# Patient Record
Sex: FEMALE | Race: OTHER | URBAN - METROPOLITAN AREA
[De-identification: names, ages, dates, MRNs, and addresses within clinical notes are randomized per-mention and may not be internally consistent; named-entity substitution may affect disease eponyms.]

---

## 2019-04-23 ENCOUNTER — INPATIENT (INPATIENT)
Facility: HOSPITAL | Age: 84
LOS: 3 days | Discharge: HOME HEALTH | End: 2019-04-27
Attending: HOSPITALIST | Admitting: HOSPITALIST
Payer: MEDICARE

## 2019-04-23 VITALS
OXYGEN SATURATION: 98 % | DIASTOLIC BLOOD PRESSURE: 82 MMHG | SYSTOLIC BLOOD PRESSURE: 132 MMHG | HEART RATE: 136 BPM | RESPIRATION RATE: 20 BRPM | TEMPERATURE: 97 F

## 2019-04-23 LAB
ALBUMIN SERPL ELPH-MCNC: 4 G/DL — SIGNIFICANT CHANGE UP (ref 3.5–5.2)
ALP SERPL-CCNC: 289 U/L — HIGH (ref 30–115)
ALT FLD-CCNC: 86 U/L — HIGH (ref 0–41)
ANION GAP SERPL CALC-SCNC: 17 MMOL/L — HIGH (ref 7–14)
APTT BLD: 25.5 SEC — LOW (ref 27–39.2)
AST SERPL-CCNC: 73 U/L — HIGH (ref 0–41)
BASE EXCESS BLDV CALC-SCNC: -1.1 MMOL/L — SIGNIFICANT CHANGE UP (ref -2–2)
BILIRUB SERPL-MCNC: 0.8 MG/DL — SIGNIFICANT CHANGE UP (ref 0.2–1.2)
BUN SERPL-MCNC: 29 MG/DL — HIGH (ref 10–20)
CA-I SERPL-SCNC: 0.92 MMOL/L — LOW (ref 1.12–1.3)
CALCIUM SERPL-MCNC: 7.4 MG/DL — LOW (ref 8.5–10.1)
CHLORIDE SERPL-SCNC: 93 MMOL/L — LOW (ref 98–110)
CO2 SERPL-SCNC: 20 MMOL/L — SIGNIFICANT CHANGE UP (ref 17–32)
CREAT SERPL-MCNC: 1.1 MG/DL — SIGNIFICANT CHANGE UP (ref 0.7–1.5)
D DIMER BLD IA.RAPID-MCNC: 1051 NG/ML DDU — HIGH (ref 0–230)
GAS PNL BLDV: 126 MMOL/L — LOW (ref 136–145)
GAS PNL BLDV: SIGNIFICANT CHANGE UP
GLUCOSE SERPL-MCNC: 158 MG/DL — HIGH (ref 70–99)
HCO3 BLDV-SCNC: 24 MMOL/L — SIGNIFICANT CHANGE UP (ref 22–29)
HCT VFR BLD CALC: 39.2 % — SIGNIFICANT CHANGE UP (ref 37–47)
HCT VFR BLDA CALC: 57.6 % — HIGH (ref 34–44)
HGB BLD CALC-MCNC: 18.8 G/DL — HIGH (ref 14–18)
HGB BLD-MCNC: 12.9 G/DL — SIGNIFICANT CHANGE UP (ref 12–16)
INR BLD: 1.33 RATIO — HIGH (ref 0.65–1.3)
INR BLD: 1.36 RATIO — HIGH (ref 0.65–1.3)
LACTATE BLDV-MCNC: 1.8 MMOL/L — HIGH (ref 0.5–1.6)
MCHC RBC-ENTMCNC: 28.4 PG — SIGNIFICANT CHANGE UP (ref 27–31)
MCHC RBC-ENTMCNC: 32.9 G/DL — SIGNIFICANT CHANGE UP (ref 32–37)
MCV RBC AUTO: 86.3 FL — SIGNIFICANT CHANGE UP (ref 81–99)
NRBC # BLD: 0 /100 WBCS — SIGNIFICANT CHANGE UP (ref 0–0)
NT-PROBNP SERPL-SCNC: 4307 PG/ML — HIGH (ref 0–300)
PCO2 BLDV: 40 MMHG — LOW (ref 41–51)
PH BLDV: 7.38 — SIGNIFICANT CHANGE UP (ref 7.26–7.43)
PLATELET # BLD AUTO: 273 K/UL — SIGNIFICANT CHANGE UP (ref 130–400)
PO2 BLDV: 21 MMHG — SIGNIFICANT CHANGE UP (ref 20–40)
POTASSIUM BLDV-SCNC: 4.3 MMOL/L — SIGNIFICANT CHANGE UP (ref 3.3–5.6)
POTASSIUM SERPL-MCNC: 4.9 MMOL/L — SIGNIFICANT CHANGE UP (ref 3.5–5)
POTASSIUM SERPL-SCNC: 4.9 MMOL/L — SIGNIFICANT CHANGE UP (ref 3.5–5)
PROT SERPL-MCNC: 6.3 G/DL — SIGNIFICANT CHANGE UP (ref 6–8)
PROTHROM AB SERPL-ACNC: 15.2 SEC — HIGH (ref 9.95–12.87)
PROTHROM AB SERPL-ACNC: 15.6 SEC — HIGH (ref 9.95–12.87)
RBC # BLD: 4.54 M/UL — SIGNIFICANT CHANGE UP (ref 4.2–5.4)
RBC # FLD: 14.8 % — HIGH (ref 11.5–14.5)
SAO2 % BLDV: 21 % — SIGNIFICANT CHANGE UP
SODIUM SERPL-SCNC: 130 MMOL/L — LOW (ref 135–146)
TROPONIN T SERPL-MCNC: <0.01 NG/ML — SIGNIFICANT CHANGE UP
WBC # BLD: 8.78 K/UL — SIGNIFICANT CHANGE UP (ref 4.8–10.8)
WBC # FLD AUTO: 8.78 K/UL — SIGNIFICANT CHANGE UP (ref 4.8–10.8)

## 2019-04-23 PROCEDURE — 93010 ELECTROCARDIOGRAM REPORT: CPT | Mod: 77

## 2019-04-23 PROCEDURE — 71045 X-RAY EXAM CHEST 1 VIEW: CPT | Mod: 26

## 2019-04-23 PROCEDURE — 99285 EMERGENCY DEPT VISIT HI MDM: CPT

## 2019-04-23 PROCEDURE — 93010 ELECTROCARDIOGRAM REPORT: CPT

## 2019-04-23 PROCEDURE — 74177 CT ABD & PELVIS W/CONTRAST: CPT | Mod: 26

## 2019-04-23 PROCEDURE — 71275 CT ANGIOGRAPHY CHEST: CPT | Mod: 26

## 2019-04-23 RX ORDER — SODIUM CHLORIDE 9 MG/ML
500 INJECTION INTRAMUSCULAR; INTRAVENOUS; SUBCUTANEOUS ONCE
Qty: 0 | Refills: 0 | Status: COMPLETED | OUTPATIENT
Start: 2019-04-23 | End: 2019-04-23

## 2019-04-23 RX ORDER — DILTIAZEM HCL 120 MG
10 CAPSULE, EXT RELEASE 24 HR ORAL ONCE
Qty: 0 | Refills: 0 | Status: COMPLETED | OUTPATIENT
Start: 2019-04-23 | End: 2019-04-23

## 2019-04-23 RX ORDER — SODIUM CHLORIDE 9 MG/ML
500 INJECTION, SOLUTION INTRAVENOUS ONCE
Qty: 0 | Refills: 0 | Status: COMPLETED | OUTPATIENT
Start: 2019-04-23 | End: 2019-04-23

## 2019-04-23 RX ORDER — FUROSEMIDE 40 MG
40 TABLET ORAL ONCE
Qty: 0 | Refills: 0 | Status: COMPLETED | OUTPATIENT
Start: 2019-04-23 | End: 2019-04-23

## 2019-04-23 RX ORDER — ADENOSINE 3 MG/ML
6 INJECTION INTRAVENOUS ONCE
Qty: 0 | Refills: 0 | Status: COMPLETED | OUTPATIENT
Start: 2019-04-23 | End: 2019-04-23

## 2019-04-23 RX ORDER — ADENOSINE 3 MG/ML
12 INJECTION INTRAVENOUS ONCE
Qty: 0 | Refills: 0 | Status: COMPLETED | OUTPATIENT
Start: 2019-04-23 | End: 2019-04-23

## 2019-04-23 RX ORDER — ADENOSINE 3 MG/ML
18 INJECTION INTRAVENOUS ONCE
Qty: 0 | Refills: 0 | Status: COMPLETED | OUTPATIENT
Start: 2019-04-23 | End: 2019-04-23

## 2019-04-23 RX ADMIN — SODIUM CHLORIDE 500 MILLILITER(S): 9 INJECTION, SOLUTION INTRAVENOUS at 18:48

## 2019-04-23 RX ADMIN — Medication 40 MILLIGRAM(S): at 23:58

## 2019-04-23 RX ADMIN — ADENOSINE 6 MILLIGRAM(S): 3 INJECTION INTRAVENOUS at 18:57

## 2019-04-23 RX ADMIN — Medication 10 MILLIGRAM(S): at 22:07

## 2019-04-23 RX ADMIN — ADENOSINE 12 MILLIGRAM(S): 3 INJECTION INTRAVENOUS at 18:57

## 2019-04-23 RX ADMIN — ADENOSINE 18 MILLIGRAM(S): 3 INJECTION INTRAVENOUS at 18:57

## 2019-04-23 RX ADMIN — SODIUM CHLORIDE 500 MILLILITER(S): 9 INJECTION INTRAMUSCULAR; INTRAVENOUS; SUBCUTANEOUS at 22:07

## 2019-04-23 NOTE — ED ADULT NURSE NOTE - OBJECTIVE STATEMENT
Pt c/o SOB and palpitations. Denies CP. Apical pulse tachycardic, breath sounds clear and equal bilaterally. BP stable, HR tachycardic.

## 2019-04-23 NOTE — ED ADULT TRIAGE NOTE - CHIEF COMPLAINT QUOTE
c/o palpitations and SOB x 4 days. Denies CP. c/o palpitations and SOB x 4 days. Denies CP. r/o CHF +swelling of lower extremities.

## 2019-04-24 LAB
ALBUMIN SERPL ELPH-MCNC: 3.6 G/DL — SIGNIFICANT CHANGE UP (ref 3.5–5.2)
ALP SERPL-CCNC: 243 U/L — HIGH (ref 30–115)
ALT FLD-CCNC: 73 U/L — HIGH (ref 0–41)
ANION GAP SERPL CALC-SCNC: 15 MMOL/L — HIGH (ref 7–14)
APTT BLD: 51.8 SEC — HIGH (ref 27–39.2)
APTT BLD: 85.1 SEC — CRITICAL HIGH (ref 27–39.2)
AST SERPL-CCNC: 65 U/L — HIGH (ref 0–41)
BASOPHILS # BLD AUTO: 0.03 K/UL — SIGNIFICANT CHANGE UP (ref 0–0.2)
BASOPHILS NFR BLD AUTO: 0.4 % — SIGNIFICANT CHANGE UP (ref 0–1)
BILIRUB SERPL-MCNC: 0.8 MG/DL — SIGNIFICANT CHANGE UP (ref 0.2–1.2)
BUN SERPL-MCNC: 25 MG/DL — HIGH (ref 10–20)
CALCIUM SERPL-MCNC: 6.6 MG/DL — LOW (ref 8.5–10.1)
CHLORIDE SERPL-SCNC: 94 MMOL/L — LOW (ref 98–110)
CK MB CFR SERPL CALC: 5.6 NG/ML — SIGNIFICANT CHANGE UP (ref 0.6–6.3)
CO2 SERPL-SCNC: 21 MMOL/L — SIGNIFICANT CHANGE UP (ref 17–32)
CREAT SERPL-MCNC: 1 MG/DL — SIGNIFICANT CHANGE UP (ref 0.7–1.5)
EOSINOPHIL # BLD AUTO: 0.09 K/UL — SIGNIFICANT CHANGE UP (ref 0–0.7)
EOSINOPHIL NFR BLD AUTO: 1.1 % — SIGNIFICANT CHANGE UP (ref 0–8)
GLUCOSE BLDC GLUCOMTR-MCNC: 133 MG/DL — HIGH (ref 70–99)
GLUCOSE SERPL-MCNC: 113 MG/DL — HIGH (ref 70–99)
HCT VFR BLD CALC: 34.6 % — LOW (ref 37–47)
HGB BLD-MCNC: 11.3 G/DL — LOW (ref 12–16)
IMM GRANULOCYTES NFR BLD AUTO: 0.5 % — HIGH (ref 0.1–0.3)
INR BLD: 1.42 RATIO — HIGH (ref 0.65–1.3)
LACTATE SERPL-SCNC: 2.1 MMOL/L — SIGNIFICANT CHANGE UP (ref 0.5–2.2)
LYMPHOCYTES # BLD AUTO: 1.9 K/UL — SIGNIFICANT CHANGE UP (ref 1.2–3.4)
LYMPHOCYTES # BLD AUTO: 23.6 % — SIGNIFICANT CHANGE UP (ref 20.5–51.1)
MAGNESIUM SERPL-MCNC: 1.5 MG/DL — LOW (ref 1.8–2.4)
MCHC RBC-ENTMCNC: 28.3 PG — SIGNIFICANT CHANGE UP (ref 27–31)
MCHC RBC-ENTMCNC: 32.7 G/DL — SIGNIFICANT CHANGE UP (ref 32–37)
MCV RBC AUTO: 86.5 FL — SIGNIFICANT CHANGE UP (ref 81–99)
MONOCYTES # BLD AUTO: 0.64 K/UL — HIGH (ref 0.1–0.6)
MONOCYTES NFR BLD AUTO: 8 % — SIGNIFICANT CHANGE UP (ref 1.7–9.3)
NEUTROPHILS # BLD AUTO: 5.34 K/UL — SIGNIFICANT CHANGE UP (ref 1.4–6.5)
NEUTROPHILS NFR BLD AUTO: 66.4 % — SIGNIFICANT CHANGE UP (ref 42.2–75.2)
NRBC # BLD: 0 /100 WBCS — SIGNIFICANT CHANGE UP (ref 0–0)
PLATELET # BLD AUTO: 255 K/UL — SIGNIFICANT CHANGE UP (ref 130–400)
POTASSIUM SERPL-MCNC: 4.4 MMOL/L — SIGNIFICANT CHANGE UP (ref 3.5–5)
POTASSIUM SERPL-SCNC: 4.4 MMOL/L — SIGNIFICANT CHANGE UP (ref 3.5–5)
PROT SERPL-MCNC: 5.5 G/DL — LOW (ref 6–8)
PROTHROM AB SERPL-ACNC: 16.3 SEC — HIGH (ref 9.95–12.87)
RBC # BLD: 4 M/UL — LOW (ref 4.2–5.4)
RBC # FLD: 15 % — HIGH (ref 11.5–14.5)
SODIUM SERPL-SCNC: 130 MMOL/L — LOW (ref 135–146)
TROPONIN T SERPL-MCNC: 0.01 NG/ML — SIGNIFICANT CHANGE UP
TROPONIN T SERPL-MCNC: 0.01 NG/ML — SIGNIFICANT CHANGE UP
WBC # BLD: 8.04 K/UL — SIGNIFICANT CHANGE UP (ref 4.8–10.8)
WBC # FLD AUTO: 8.04 K/UL — SIGNIFICANT CHANGE UP (ref 4.8–10.8)

## 2019-04-24 PROCEDURE — 99222 1ST HOSP IP/OBS MODERATE 55: CPT

## 2019-04-24 PROCEDURE — 70450 CT HEAD/BRAIN W/O DYE: CPT | Mod: 26

## 2019-04-24 PROCEDURE — 93010 ELECTROCARDIOGRAM REPORT: CPT

## 2019-04-24 PROCEDURE — 76705 ECHO EXAM OF ABDOMEN: CPT | Mod: 26

## 2019-04-24 PROCEDURE — 71045 X-RAY EXAM CHEST 1 VIEW: CPT | Mod: 26

## 2019-04-24 RX ORDER — HEPARIN SODIUM 5000 [USP'U]/ML
800 INJECTION INTRAVENOUS; SUBCUTANEOUS
Qty: 25000 | Refills: 0 | Status: DISCONTINUED | OUTPATIENT
Start: 2019-04-24 | End: 2019-04-25

## 2019-04-24 RX ORDER — SODIUM CHLORIDE 9 MG/ML
500 INJECTION INTRAMUSCULAR; INTRAVENOUS; SUBCUTANEOUS ONCE
Qty: 0 | Refills: 0 | Status: COMPLETED | OUTPATIENT
Start: 2019-04-24 | End: 2019-04-24

## 2019-04-24 RX ORDER — CHLORHEXIDINE GLUCONATE 213 G/1000ML
1 SOLUTION TOPICAL
Qty: 0 | Refills: 0 | Status: DISCONTINUED | OUTPATIENT
Start: 2019-04-24 | End: 2019-04-27

## 2019-04-24 RX ORDER — DILTIAZEM HCL 120 MG
5 CAPSULE, EXT RELEASE 24 HR ORAL
Qty: 125 | Refills: 0 | Status: DISCONTINUED | OUTPATIENT
Start: 2019-04-24 | End: 2019-04-24

## 2019-04-24 RX ORDER — DOCUSATE SODIUM 100 MG
100 CAPSULE ORAL
Qty: 0 | Refills: 0 | Status: DISCONTINUED | OUTPATIENT
Start: 2019-04-24 | End: 2019-04-27

## 2019-04-24 RX ORDER — FUROSEMIDE 40 MG
40 TABLET ORAL DAILY
Qty: 0 | Refills: 0 | Status: DISCONTINUED | OUTPATIENT
Start: 2019-04-24 | End: 2019-04-25

## 2019-04-24 RX ORDER — DILTIAZEM HCL 120 MG
30 CAPSULE, EXT RELEASE 24 HR ORAL EVERY 8 HOURS
Qty: 0 | Refills: 0 | Status: DISCONTINUED | OUTPATIENT
Start: 2019-04-24 | End: 2019-04-25

## 2019-04-24 RX ORDER — ENOXAPARIN SODIUM 100 MG/ML
40 INJECTION SUBCUTANEOUS AT BEDTIME
Qty: 0 | Refills: 0 | Status: DISCONTINUED | OUTPATIENT
Start: 2019-04-24 | End: 2019-04-24

## 2019-04-24 RX ORDER — APIXABAN 2.5 MG/1
10 TABLET, FILM COATED ORAL EVERY 12 HOURS
Qty: 0 | Refills: 0 | Status: DISCONTINUED | OUTPATIENT
Start: 2019-04-24 | End: 2019-04-24

## 2019-04-24 RX ORDER — OMEPRAZOLE 10 MG/1
1 CAPSULE, DELAYED RELEASE ORAL
Qty: 0 | Refills: 0 | COMMUNITY

## 2019-04-24 RX ORDER — DILTIAZEM HCL 120 MG
10 CAPSULE, EXT RELEASE 24 HR ORAL ONCE
Qty: 0 | Refills: 0 | Status: COMPLETED | OUTPATIENT
Start: 2019-04-24 | End: 2019-04-24

## 2019-04-24 RX ORDER — MAGNESIUM SULFATE 500 MG/ML
2 VIAL (ML) INJECTION
Qty: 0 | Refills: 0 | Status: COMPLETED | OUTPATIENT
Start: 2019-04-24 | End: 2019-04-24

## 2019-04-24 RX ORDER — METOPROLOL TARTRATE 50 MG
25 TABLET ORAL DAILY
Qty: 0 | Refills: 0 | Status: DISCONTINUED | OUTPATIENT
Start: 2019-04-24 | End: 2019-04-24

## 2019-04-24 RX ORDER — DILTIAZEM HCL 120 MG
30 CAPSULE, EXT RELEASE 24 HR ORAL EVERY 6 HOURS
Qty: 0 | Refills: 0 | Status: DISCONTINUED | OUTPATIENT
Start: 2019-04-24 | End: 2019-04-24

## 2019-04-24 RX ORDER — PANTOPRAZOLE SODIUM 20 MG/1
40 TABLET, DELAYED RELEASE ORAL
Qty: 0 | Refills: 0 | Status: DISCONTINUED | OUTPATIENT
Start: 2019-04-24 | End: 2019-04-27

## 2019-04-24 RX ADMIN — Medication 100 MILLIGRAM(S): at 18:22

## 2019-04-24 RX ADMIN — Medication 10 MILLIGRAM(S): at 00:46

## 2019-04-24 RX ADMIN — Medication 25 GRAM(S): at 10:45

## 2019-04-24 RX ADMIN — Medication 5 MG/HR: at 00:29

## 2019-04-24 RX ADMIN — Medication 25 MILLIGRAM(S): at 06:16

## 2019-04-24 RX ADMIN — Medication 30 MILLIGRAM(S): at 06:21

## 2019-04-24 RX ADMIN — Medication 5 MG/HR: at 06:19

## 2019-04-24 RX ADMIN — Medication 25 GRAM(S): at 14:46

## 2019-04-24 RX ADMIN — Medication 30 MILLIGRAM(S): at 14:48

## 2019-04-24 RX ADMIN — PANTOPRAZOLE SODIUM 40 MILLIGRAM(S): 20 TABLET, DELAYED RELEASE ORAL at 06:16

## 2019-04-24 RX ADMIN — SODIUM CHLORIDE 1000 MILLILITER(S): 9 INJECTION INTRAMUSCULAR; INTRAVENOUS; SUBCUTANEOUS at 09:25

## 2019-04-24 RX ADMIN — Medication 40 MILLIGRAM(S): at 06:19

## 2019-04-24 RX ADMIN — Medication 100 MILLIGRAM(S): at 06:16

## 2019-04-24 RX ADMIN — HEPARIN SODIUM 8 UNIT(S)/HR: 5000 INJECTION INTRAVENOUS; SUBCUTANEOUS at 15:21

## 2019-04-24 NOTE — ED PROVIDER NOTE - OBJECTIVE STATEMENT
89 year old female with a pmh of CHF came in because she was sent in by her PMD in the [place] for further evaluation. Patient states she recently came back from [facility] 89 year old female with a pmh of CHF came in because she was sent in by her PMD in the [place] for further evaluation. Patient states she recently came back from [place] one week ago and has been having sob. Patient went to see her PMD today and sent to ED for further eval. Patient denies fever chills cough n/v abdominal pain urinary frequency urgency burning.

## 2019-04-24 NOTE — H&P ADULT - ATTENDING COMMENTS
HPI as above.  Interval history: Pt seen and examined at bedside. Pt Azeri speaking. Daughter at bedside speaks english. Daughter states that 3 weeks ago the pt was fine and started to develop palpitations on and off. She then began having sob on ambulation and was unable to walk 1 block without sob. She also was exhibiting Orthopnea and increased usage of pillows from 1-2 due to sob.  She then went to see her PMD in the [place] yesterday who told her she needs to go to the ER to be checked out. No previous hx or occurrence  in the past as per daughter. Further review of her med list shows she also is on lisinopril 10 mg and atorvastatin 10 mg.  In the ED pt was placed on cardizem ggt for loading and eventually switched to PO but ggt was not stopped. In the am Pt bp dropped to 60s and developed AMS. Stroke code called see separate note.     Vital Signs (24 Hrs):  T(C): 37 (04-24-19 @ 07:39), Max: 37 (04-24-19 @ 07:39)  HR: 64 (04-24-19 @ 10:46) (64 - 136)  BP: 91/58 (04-24-19 @ 10:46) (60/45 - 137/93)  RR: 20 (04-24-19 @ 10:46) (18 - 20)  SpO2: 99% (04-24-19 @ 10:46) (96% - 100%)  Wt(kg): --  Daily     Daily     I&O's Summary    23 Apr 2019 07:01  -  24 Apr 2019 07:00  --------------------------------------------------------  IN: 30 mL / OUT: 0 mL / NET: 30 mL      Exam:  PHYSICAL EXAM:  GENERAL: NAD, well-developed  HEAD:  Atraumatic, Normocephalic  EYES: EOMI, PERRLA, conjunctiva and sclera clear  NECK: Supple, No JVD  CHEST/LUNG: mild bibasilar rales  HEART: Regular rate and irrregular rhythm; No murmurs, rubs, or gallops  ABDOMEN: Soft, Nontender, Nondistended; Bowel sounds present  EXTREMITIES:  2+ Peripheral Pulses, No clubbing, cyanosis, or edema  PSYCH: AAOx2  NEUROLOGY: weakness on left lower ext, mild droop on right face and slurring of speech (new according to daughter)  SKIN: No rashes or lesions    Labs reviewed  Imaging reviewed < from: Xray Chest 1 View AP/PA (04.24.19 @ 06:35) >    Cardiomegaly. Right midlung layering fluid.    < end of copied text >  1. Reflux of intravenous contrast into the IVC, cardiomegaly, trace   bilateral pleural effusions with interstitial edema; findings can be seen   with CHF.    2. No evidence of acute pulmonary embolismor acute intra-abdominal   pathology.    < end of copied text >    Diagnosis Line Atrial fibrillation  Septal infarct , age undetermined  Abnormal ECG    < end of copied text >    Plan  #New on set Afib-CHadsvasc 4- currently RC on BB and cardizem 30 mg TID- decreased from q6hr- will need AC- pending MRI- dw neuro recommended hold of on NOAC for now until stroke anderson complete and can start heparin ggt in the mean time. Risk of bleed on heparin ggt dw family and agreed. Will dw family risks of bleeding with NOAC once cleared. Cardio consult placed     #Hypotensive shock 2/2 afib with RVR vs CHF- CT shows vascular congestion- not on pressor for now- ok to keep BP MAP 60-65 for now- fu cardio, given IVF 1 L bolus, monitor BP and respiratory status.     #Encephalopathy likely 2/2 hypotension vs CVA- fu MRI, sx improved after BP improved    #Suspected HFrEF- pt on BB and lisinopril at home- fu 2d echo for confirmation, po lasix for now as pt o2 sat 96% on RA and may need IV lasix once BP improves.      #Gall bladder wall edema- elevated ALP- check ruq     #Progress Note Handoff  Pending (specify):  Consults____Cardio, neuro_____, Tests___2d echo_____, Test Results_______, Other_________  Family discussion: homer pt and family and agreed with plan  Disposition: Home___/SNF___/Other________/Unknown at this time___x_____

## 2019-04-24 NOTE — CONSULT NOTE ADULT - SUBJECTIVE AND OBJECTIVE BOX
HPI:  89 y.o. f w/ PMHx of HTN, DLD presents with a chief complaint of palpitations associated with shortness of breath. It started 4 days ago and progressively worsened since then. Patient denies ever having this before. Patient denies any fevers, chills, or night sweats. Patient denies any nausea, vomiting, or diarrhea.     In ER- EKG looked like SVT so was given adenosine 6, 12, then 18 with no improvement, then was determiend it was Afib w/ RVR and cardizem was given which brought the HR down to around 110's. (24 Apr 2019 02:25)      PAST MEDICAL & SURGICAL HISTORY:  Dyslipidemia  Hypertension  No significant past surgical history      Hospital Course:    TODAY'S SUBJECTIVE & REVIEW OF SYMPTOMS:     Constitutional WNL   Cardio WNL   Resp WNL   GI WNL  Heme WNL  Endo WNL  Skin WNL  MSK Weakness  Neuro WNL  Cognitive WNL  Psych WNL      MEDICATIONS  (STANDING):  chlorhexidine 2% Cloths 1 Application(s) Topical <User Schedule>  diltiazem    Tablet 30 milliGRAM(s) Oral every 8 hours  docusate sodium 100 milliGRAM(s) Oral two times a day  furosemide    Tablet 40 milliGRAM(s) Oral daily  heparin  Infusion 800 Unit(s)/Hr (8 mL/Hr) IV Continuous <Continuous>  metoprolol succinate ER 25 milliGRAM(s) Oral daily  pantoprazole    Tablet 40 milliGRAM(s) Oral before breakfast    MEDICATIONS  (PRN):      FAMILY HISTORY:  No pertinent family history in first degree relatives      Allergies    No Known Allergies    Intolerances        SOCIAL HISTORY:    [  ] Etoh  [  ] Smoking  [  ] Substance abuse     Home Environment:  [  ] Home Alone  [x  ] Lives with Family  [  ] Home Health Aid    Dwelling:  [  ] Apartment  [ x ] Private House  [  ] Adult Home  [  ] Skilled Nursing Facility      [  ] Short Term  [  ] Long Term  [xx  ] Stairs       Elevator [  ]    FUNCTIONAL STATUS PTA: (Check all that apply)  Ambulation: [ x  ]Independent    [  ] Dependent     [  ] Non-Ambulatory  Assistive Device: [  ] SA Cane  [  ]  Q Cane  [  ] Walker  [  ]  Wheelchair  ADL : [x  ] Independent  [  ]  Dependent       Vital Signs Last 24 Hrs  T(C): 36.2 (24 Apr 2019 15:43), Max: 37 (24 Apr 2019 07:39)  T(F): 97.1 (24 Apr 2019 15:43), Max: 98.6 (24 Apr 2019 07:39)  HR: 62 (24 Apr 2019 15:43) (62 - 136)  BP: 88/58 (24 Apr 2019 15:43) (60/45 - 137/93)  BP(mean): 70 (24 Apr 2019 04:47) (70 - 110)  RR: 17 (24 Apr 2019 15:43) (17 - 20)  SpO2: 97% (24 Apr 2019 15:43) (96% - 100%)      PHYSICAL EXAM: Alert & Oriented X3  GENERAL: NAD, well-groomed, well-developed  HEAD:  Atraumatic, Normocephalic  CHEST/LUNG: Clear   HEART: S1S2+  ABDOMEN: Soft, Nontender  EXTREMITIES:  no calf tenderness    NERVOUS SYSTEM:  Cranial Nerves 2-12 intact [  ] Abnormal  [  ]  ROM: WFL all extremities [ x ]  Abnormal [  ]  Motor Strength: WFL all extremities  [  ]  Abnormal [x  ]4/5 all ext  Sensation: intact to light touch [x  ] Abnormal [  ]  Reflexes: Symmetric [  ]  Abnormal [  ]    FUNCTIONAL STATUS:  Bed Mobility: Independent [  ]  Supervision [  ]  Needs Assistance [x  ]  N/A [  ]  Transfers: Independent [  ]  Supervision [  ]  Needs Assistance [x  ]  N/A [  ]   Ambulation: Independent [  ]  Supervision [  ]  Needs Assistance [  ]  N/A [  ]  ADL: Independent [  ] Requires Assistance [  ] N/A [  ]      LABS:                        11.3   8.04  )-----------( 255      ( 24 Apr 2019 07:42 )             34.6     04-24    130<L>  |  94<L>  |  25<H>  ----------------------------<  113<H>  4.4   |  21  |  1.0    Ca    6.6<L>      24 Apr 2019 07:42  Mg     1.5     04-24    TPro  5.5<L>  /  Alb  3.6  /  TBili  0.8  /  DBili  x   /  AST  65<H>  /  ALT  73<H>  /  AlkPhos  243<H>  04-24    PT/INR - ( 23 Apr 2019 20:45 )   PT: 15.60 sec;   INR: 1.36 ratio         PTT - ( 23 Apr 2019 20:45 )  PTT:25.5 sec      RADIOLOGY & ADDITIONAL STUDIES:    Assesment:

## 2019-04-24 NOTE — H&P ADULT - HISTORY OF PRESENT ILLNESS
04-Mar-2019 08:40 89 y.o. f w/ PMHx of HTN, DLD presents with a chief complaint of palpitations associated with shortness of breath. It started 4 days ago and progressively worsened since then. Patient denies ever having this before. Patient denies any fevers, chills, or night sweats. Patient denies any nausea, vomiting, or diarrhea.     In ER- EKG looked like SVT so was given adenosine 6, 12, then 18 with no improvement, then was determiend it was Afib w/ RVR and cardizem was given which brought the HR down to around 110's.

## 2019-04-24 NOTE — H&P ADULT - NSHPLABSRESULTS_GEN_ALL_CORE
12.9   8.78  )-----------( 273      ( 23 Apr 2019 18:20 )             39.2       04-23    130<L>  |  93<L>  |  29<H>  ----------------------------<  158<H>  4.9   |  20  |  1.1    Ca    7.4<L>      23 Apr 2019 18:20    TPro  6.3  /  Alb  4.0  /  TBili  0.8  /  DBili  x   /  AST  73<H>  /  ALT  86<H>  /  AlkPhos  289<H>  04-23      LIVER FUNCTIONS - ( 23 Apr 2019 18:20 )  Alb: 4.0 g/dL / Pro: 6.3 g/dL / ALK PHOS: 289 U/L / ALT: 86 U/L / AST: 73 U/L / GGT: x             PT/INR - ( 23 Apr 2019 20:45 )   PT: 15.60 sec;   INR: 1.36 ratio         PTT - ( 23 Apr 2019 20:45 )  PTT:25.5 sec            CARDIAC MARKERS ( 23 Apr 2019 18:20 )  x     / <0.01 ng/mL / x     / x     / x          < from: CT Abdomen and Pelvis w/ IV Cont (04.23.19 @ 22:44) >    1. Reflux of intravenous contrast into the IVC, cardiomegaly, trace   bilateral pleural effusions with interstitial edema; findings can be seen   with CHF.    2. No evidence of acute pulmonary embolismor acute intra-abdominal   pathology.    < end of copied text >

## 2019-04-24 NOTE — H&P ADULT - ASSESSMENT
89 y.o. f w/ PMHx of HTN, DLD presents with a chief complaint of palpitations associated with shortness of breath.    #) New Onset Afib  -admit to tele  -started cardizem PO q6h w/ holding parameter if SBP <100  -cardizem infusion  -started eliquis for AC    #) CHF  -imaging suggestive of CHF  -B/L LE edema, but no JVD, lungs CTA B/L  -2D echo ordered  -started lasix 40mg daily    #) HTN  -c/w home med (metoprolol)    #) DVT/ GI ppx  -eliquis, protonix    #) Code Status  -full code    #) Dispo  -from home, just came from  1 week ago  -ambulates with wheelchair  -Physiatry/ PT eval c/s placed

## 2019-04-24 NOTE — CONSULT NOTE ADULT - ATTENDING COMMENTS
Patient examined during code stroke and had rapidly improving deficits.  Further evaluation with MRI/MRA is reasonable given the lateralized findings to exclude underlying intra/extracranial stenosis. Continue medical management including statin and antiplatelets.

## 2019-04-24 NOTE — CONSULT NOTE ADULT - ASSESSMENT
89 y.o. f w/ PMHx of HTN, DLD presents with a chief complaint of palpitations associated with shortness of breath.     Impression:  Palpitations due to new onset Aflutter/ Afib with RVR (CHADS VAsc 4)  CHF- new onset (systolic vs diastolic): likely triggered by Afib  AMS- due to hypotension vs CVA  HTN  DLD    Plan:  tele monitoring  cont AC with IV heparin  switch to Eliquis 5mg BID when cleared by neuro  cont lasix 40 mg daily  hold metoprolol  cont cardizem 30 mg Q8 for rate control  check TTE  f/u neuro work up. 89 y.o. f w/ PMHx of HTN, DLD presents with a chief complaint of palpitations associated with shortness of breath.     Impression:  Palpitations due to new onset Aflutter/ Afib with RVR (CHADS VAsc 4)  CHF- new onset (systolic vs diastolic): likely triggered by Afib  AMS- due to hypotension vs CVA  HTN  DLD    Plan:  tele monitoring  currently rate controlled   cont AC with IV heparin  switch to Eliquis 5mg BID when cleared by neuro  cont lasix 40 mg daily  hold metoprolol  cont cardizem 30 mg Q8 for rate control  check TTE  f/u neuro work up. 89 y.o. f w/ PMHx of HTN, DLD presents with a chief complaint of palpitations associated with shortness of breath.     Impression:  Palpitations due to new onset Aflutter/ Afib with RVR (CHADS VAsc 4)  CHF- new onset (systolic vs diastolic): likely triggered by Afib  AMS- due to hypotension vs CVA  HTN  DLD    Plan:  tele monitoring  currently rate controlled   cont AC with IV heparin  switch to Eliquis 2.5mg BID when cleared by neuro  cont lasix 40 mg daily  hold metoprolol  cont cardizem 30 mg Q8 for rate control, increase as needed. - may switch to sustained release on discharge.  check TTE  f/u neuro work up.

## 2019-04-24 NOTE — ED PROVIDER NOTE - NS ED ROS FT
Constitutional: See HPI.  Eyes: No visual changes  ENMT: No neck pain   Cardiac: see hpi  Respiratory: No cough   GI: No nausea, vomiting, diarrhea or abdominal pain.  : No dysuria, frequency or burning.  MS: No myalgia, muscle weakness, joint pain or back pain.  Neuro: No headache   Skin: No skin rash.

## 2019-04-24 NOTE — H&P ADULT - NSHPPHYSICALEXAM_GEN_ALL_CORE
Gen- No acute distress  Head- atraumatic, normocephalic  ENT- normal oropharynx  Cardio- normal S1, S2  Pulm- CTA B/L  Abd- nontender, nondistended  Ext- 2+pitting edema in B/L LE  Neuro- AOx4  Psych- cooperative, appropriate

## 2019-04-24 NOTE — CONSULT NOTE ADULT - ASSESSMENT

## 2019-04-24 NOTE — ED PROVIDER NOTE - ATTENDING CONTRIBUTION TO CARE
89 year old female, history of CHF, comes in with sob, patient also endorses palpitations, + recent tarvel, no chest pain, no n/v/d, no hemoptysis, no fever. Patient intially evaluated by DR. Stacy, administered adenosine for SVT, no improvement. Patient rate controlled with CCB, place don drip, patient had negative CTA for PE, patient admitted for rate control.     CONSTITUTIONAL: Well-developed; well-nourished; in no acute distress. Sitting up and providing appropriate history and physical examination  SKIN: skin exam is warm and dry, no acute rash.  HEAD: Normocephalic; atraumatic.  EYES: PERRL, 3 mm bilateral, no nystagmus, EOM intact; conjunctiva and sclera clear.  ENT: No nasal discharge; airway clear.  NECK: Supple; non tender. + full passive ROM in all directions. No JVD  CARD: S1, S2 normal; no murmurs, gallops, or rubs. + rapid and irregular rate. + Symmetric Strong Pulses  RESP: No wheezes, rales or rhonchi. Good air movement bilaterally  ABD: soft; non-distended; non-tender. No Rebound, No Guarding, No signs of peritonitis, No CVA tenderness. No pulsatile abdominal mass. + Strong and Symmetric Pulses  EXT: Normal ROM. No clubbing, cyanosis or edema. Dp and Pt Pulses intact. Cap refill less than 3 seconds  NEURO:  Alert, oriented, grossly unremarkable. No Focal deficits. GCS 15. NIH 0  PSYCH: Cooperative, appropriate.

## 2019-04-24 NOTE — CHART NOTE - NSCHARTNOTEFT_GEN_A_CORE
Alerted by resident and RN that pt BP in low 60s-80s and having AMS  Pt seen and examined at bedside. Pt Stateless speaking and daughter speaks english at bedside.   Pt unable to remember year or where she was. Only new name. Daughter states  that she started having lightheadedness and these changes were new.  DW house staff pt here for new onset afib and chf- was on Cardizem ggt- ggt was turned off as BP was low.    Vital Signs (24 Hrs):  T(C): 37 (04-24-19 @ 07:39), Max: 37 (04-24-19 @ 07:39)  HR: 64 (04-24-19 @ 10:46) (64 - 136)  BP: 91/58 (04-24-19 @ 10:46) (60/45 - 137/93)  RR: 20 (04-24-19 @ 10:46) (18 - 20)  SpO2: 99% (04-24-19 @ 10:46) (96% - 100%)  Wt(kg): --  Daily     Daily     I&O's Summary    23 Apr 2019 07:01  -  24 Apr 2019 07:00  --------------------------------------------------------  IN: 30 mL / OUT: 0 mL / NET: 30 mL    Exam:  PHYSICAL EXAM:  GENERAL: NAD, well-developed  HEAD:  Atraumatic, Normocephalic  EYES: EOMI, PERRLA, conjunctiva and sclera clear  NECK: Supple, No JVD  CHEST/LUNG: mild bibasilar rales  HEART: Regular rate and irrregular rhythm; No murmurs, rubs, or gallops  ABDOMEN: Soft, Nontender, Nondistended; Bowel sounds present  EXTREMITIES:  2+ Peripheral Pulses, No clubbing, cyanosis, or edema  PSYCH: AAOx2  NEUROLOGY: weakness on left lower ext, mild droop on right face and slurring of speech (new according to daughter)  SKIN: No rashes or lesions    Labs reviewed  EKG reviewed    Given the new AMS, physical exam findings and new onset Afib- concerned for New CVA- Stroke code was called    A/P  #Encephalopathy 2/2 new cva vs hypotension  -Stroke code  -Stat [facility]  -Neuro consult  -Hold cardizem ggt, Cardizem oral adjusted       #Update after stoke code  < from: CT Head No Cont (04.24.19 @ 10:10) >    1.No CT evidence of acute intracranial hemorrhage or large territory   infarct.    2.  Severe chronic microvascular changes.    3.  Right sphenoid sinus near complete opacification.    < end of copied text >    Dw neuro- will need MRI, can start heparin ggt for Afib and will change to eliquis once MRI neg and neuro clears.     Greater than 30 mins of Critical Care time spent on the pt case.

## 2019-04-24 NOTE — CONSULT NOTE ADULT - SUBJECTIVE AND OBJECTIVE BOX
Patient is a 89y old  Female who presents with a chief complaint of palpitations (24 Apr 2019 16:05)    HPI:  89 y.o. f w/ PMHx of HTN, DLD presents with a chief complaint of palpitations associated with shortness of breath. It started 4 days ago and progressively worsened since then. Patient denies ever having this before. Patient denies any fevers, chills, or night sweats. Patient denies any nausea, vomiting, or diarrhea.     In ER- EKG looked like SVT so was given adenosine 6, 12, then 18 with no improvement, then was determiend it was Afib w/ RVR and cardizem was given which brought the HR down to around 110's. (24 Apr 2019 02:25)    cardiology fellow addendum: this morning,  Stroke code was called when patient became hypotensive 60/40 and became confused and lethargic with questionable right facial droop. pt was evaluated by neurology and tpa was not given due to rapid improvement in symptoms. As per pt's daughter at bedside, pt walks without support at baseline and is alert and oriented. pt did not have any h/o fall/ GI bleed/ hematuria/ chest pain. Pt is currently on room and denies any dyspnea at rest.     ROS:  All other systems reviewed and are negative    PAST MEDICAL & SURGICAL HISTORY  Dyslipidemia  Hypertension  No significant past surgical history      FAMILY HISTORY:  FAMILY HISTORY:  No pertinent family history in first degree relatives      SOCIAL HISTORY:  []smoker  []Alcohol  []Drug    ALLERGIES:  No Known Allergies      MEDICATIONS:  MEDICATIONS  (STANDING):  chlorhexidine 2% Cloths 1 Application(s) Topical <User Schedule>  diltiazem    Tablet 30 milliGRAM(s) Oral every 8 hours  docusate sodium 100 milliGRAM(s) Oral two times a day  furosemide    Tablet 40 milliGRAM(s) Oral daily  heparin  Infusion 800 Unit(s)/Hr (8 mL/Hr) IV Continuous <Continuous>  metoprolol succinate ER 25 milliGRAM(s) Oral daily  pantoprazole    Tablet 40 milliGRAM(s) Oral before breakfast    MEDICATIONS  (PRN):      HOME MEDICATIONS:  Home Medications:  Calcium 500+D oral tablet, chewable: 1 tab(s) orally 2 times a day (24 Apr 2019 02:28)  Colace 100 mg oral capsule: 1 cap(s) orally 2 times a day (24 Apr 2019 02:28)  metoprolol succinate 25 mg oral tablet, extended release: 1 tab(s) orally once a day (24 Apr 2019 02:28)  PriLOSEC 20 mg oral delayed release capsule: 1 cap(s) orally once a day (24 Apr 2019 02:28)      VITALS:   T(F): 97.1 (04-24 @ 15:43), Max: 98.6 (04-24 @ 07:39)  HR: 62 (04-24 @ 15:43) (62 - 136)  BP: 88/58 (04-24 @ 15:43) (60/45 - 137/93)  BP(mean): 70 (04-24 @ 04:47) (70 - 110)  RR: 17 (04-24 @ 15:43) (17 - 20)  SpO2: 97% (04-24 @ 15:43) (96% - 100%)    I&O's Summary    23 Apr 2019 07:01  -  24 Apr 2019 07:00  --------------------------------------------------------  IN: 30 mL / OUT: 0 mL / NET: 30 mL        PHYSICAL EXAM:  GEN: No acute distress  HEENT: no pallor  NECK: Supple, no JVD  LUNGS: bibasilar crackles   CARDIOVASCULAR: S1/S2 irregular, no murmurs or rubs  ABD: Soft, BS+  EXT: mild LE pitting edema b/l  NEURO: awake, alert, oriented to place, person     LABS:                        11.3   8.04  )-----------( 255      ( 24 Apr 2019 07:42 )             34.6     04-24    130<L>  |  94<L>  |  25<H>  ----------------------------<  113<H>  4.4   |  21  |  1.0    Ca    6.6<L>      24 Apr 2019 07:42  Mg     1.5     04-24    TPro  5.5<L>  /  Alb  3.6  /  TBili  0.8  /  DBili  x   /  AST  65<H>  /  ALT  73<H>  /  AlkPhos  243<H>  04-24    PT/INR - ( 23 Apr 2019 20:45 )   PT: 15.60 sec;   INR: 1.36 ratio         PTT - ( 23 Apr 2019 20:45 )  PTT:25.5 sec  Troponin T, Serum: 0.01 ng/mL (04-24-19 @ 11:07)  Lactate, Blood: 2.1 mmol/L (04-24-19 @ 07:42)  Troponin T, Serum: 0.01 ng/mL (04-24-19 @ 07:42)  Troponin T, Serum: <0.01 ng/mL (04-23-19 @ 18:20)    CARDIAC MARKERS ( 24 Apr 2019 11:07 )  x     / 0.01 ng/mL / x     / x     / 5.6 ng/mL  CARDIAC MARKERS ( 24 Apr 2019 07:42 )  x     / 0.01 ng/mL / x     / x     / x      CARDIAC MARKERS ( 23 Apr 2019 18:20 )  x     / <0.01 ng/mL / x     / x     / x            Troponin trend:    Serum Pro-Brain Natriuretic Peptide: 4307 pg/mL (04-23-19 @ 18:20)      Hemoglobin A1C   Thyroid      RADIOLOGY:  -CXR:  < from: Xray Chest 1 View AP/PA (04.24.19 @ 06:35) >  Impression:      Cardiomegaly. Right midlung layering fluid.    < end of copied text >    < from: CT Angio Chest w/ IV Cont (04.23.19 @ 22:40) >    IMPRESSION:   1. Reflux of intravenous contrast into the IVC, cardiomegaly, trace   bilateral pleural effusions with interstitial edema; findings can be seen   with CHF.    2. No evidence of acute pulmonary embolismor acute intra-abdominal   pathology.    < end of copied text >    -CT:  < from: CT Head No Cont (04.24.19 @ 10:10) >    IMPRESSION:    1.No CT evidence of acute intracranial hemorrhage or large territory   infarct.    2.  Severe chronic microvascular changes.    3.  Right sphenoid sinus near complete opacification.    < end of copied text >    < from: US Abdomen Limited (04.24.19 @ 12:49) >    IMPRESSION:    No gallstones are seen.    Nonspecific gallbladder wall thickening 5.8 mm, suspect cardiogenic   etiology.    Small amount of right upper quadrant ascites.    < end of copied text >        ECG:  < from: 12 Lead ECG (04.23.19 @ 18:01) >  Diagnosis Line Atrial fibrillation with rapid ventricular response  Septal infarct , age undetermined  Abnormal ECG    < end of copied text >    TELEMETRY EVENTS:  brief episode of WCT- likely Afib with aberrancy Patient is a 89y old  Female who presents with a chief complaint of palpitations (24 Apr 2019 16:05)    HPI:  89 y.o. f w/ PMHx of HTN, DLD presents with a chief complaint of palpitations associated with shortness of breath. It started 4 days ago and progressively worsened since then. Patient denies ever having this before. Patient denies any fevers, chills, or night sweats. Patient denies any nausea, vomiting, or diarrhea.     In ER- EKG looked like SVT so was given adenosine 6, 12, then 18 with no improvement, then was determiend it was Afib w/ RVR and cardizem was given which brought the HR down to around 110's. (24 Apr 2019 02:25)    cardiology fellow addendum: this morning,  Stroke code was called when patient became hypotensive 60/40 and became confused and lethargic with questionable right facial droop. pt was evaluated by neurology and tpa was not given due to rapid improvement in symptoms. As per pt's daughter at bedside, pt walks without support at baseline and is alert and oriented. pt did not have any h/o fall/ GI bleed/ hematuria/ chest pain. Pt is currently on room and denies any dyspnea at rest.     ROS:  All other systems reviewed and are negative    PAST MEDICAL & SURGICAL HISTORY  Dyslipidemia  Hypertension  No significant past surgical history      FAMILY HISTORY:  FAMILY HISTORY:  No pertinent family history in first degree relatives      SOCIAL HISTORY:  []smoker  []Alcohol  []Drug    ALLERGIES:  No Known Allergies      MEDICATIONS:  MEDICATIONS  (STANDING):  chlorhexidine 2% Cloths 1 Application(s) Topical <User Schedule>  diltiazem    Tablet 30 milliGRAM(s) Oral every 8 hours  docusate sodium 100 milliGRAM(s) Oral two times a day  furosemide    Tablet 40 milliGRAM(s) Oral daily  heparin  Infusion 800 Unit(s)/Hr (8 mL/Hr) IV Continuous <Continuous>  metoprolol succinate ER 25 milliGRAM(s) Oral daily  pantoprazole    Tablet 40 milliGRAM(s) Oral before breakfast    MEDICATIONS  (PRN):      HOME MEDICATIONS:  Home Medications:  Calcium 500+D oral tablet, chewable: 1 tab(s) orally 2 times a day (24 Apr 2019 02:28)  Colace 100 mg oral capsule: 1 cap(s) orally 2 times a day (24 Apr 2019 02:28)  metoprolol succinate 25 mg oral tablet, extended release: 1 tab(s) orally once a day (24 Apr 2019 02:28)  PriLOSEC 20 mg oral delayed release capsule: 1 cap(s) orally once a day (24 Apr 2019 02:28)      VITALS:   T(F): 97.1 (04-24 @ 15:43), Max: 98.6 (04-24 @ 07:39)  HR: 62 (04-24 @ 15:43) (62 - 136)  BP: 88/58 (04-24 @ 15:43) (60/45 - 137/93)  BP(mean): 70 (04-24 @ 04:47) (70 - 110)  RR: 17 (04-24 @ 15:43) (17 - 20)  SpO2: 97% (04-24 @ 15:43) (96% - 100%)    I&O's Summary    23 Apr 2019 07:01  -  24 Apr 2019 07:00  --------------------------------------------------------  IN: 30 mL / OUT: 0 mL / NET: 30 mL        PHYSICAL EXAM:  GEN: No acute distress  HEENT: no pallor  NECK: Supple, no JVD  LUNGS: bibasilar crackles   CARDIOVASCULAR: S1/S2 irregular, no murmurs or rubs  ABD: Soft, BS+  EXT/MSK: mild LE pitting edema b/l  NEURO/PSych: awake, alert, oriented to place, person   Skin: intact    LABS:                        11.3   8.04  )-----------( 255      ( 24 Apr 2019 07:42 )             34.6     04-24    130<L>  |  94<L>  |  25<H>  ----------------------------<  113<H>  4.4   |  21  |  1.0    Ca    6.6<L>      24 Apr 2019 07:42  Mg     1.5     04-24    TPro  5.5<L>  /  Alb  3.6  /  TBili  0.8  /  DBili  x   /  AST  65<H>  /  ALT  73<H>  /  AlkPhos  243<H>  04-24    PT/INR - ( 23 Apr 2019 20:45 )   PT: 15.60 sec;   INR: 1.36 ratio         PTT - ( 23 Apr 2019 20:45 )  PTT:25.5 sec  Troponin T, Serum: 0.01 ng/mL (04-24-19 @ 11:07)  Lactate, Blood: 2.1 mmol/L (04-24-19 @ 07:42)  Troponin T, Serum: 0.01 ng/mL (04-24-19 @ 07:42)  Troponin T, Serum: <0.01 ng/mL (04-23-19 @ 18:20)    CARDIAC MARKERS ( 24 Apr 2019 11:07 )  x     / 0.01 ng/mL / x     / x     / 5.6 ng/mL  CARDIAC MARKERS ( 24 Apr 2019 07:42 )  x     / 0.01 ng/mL / x     / x     / x      CARDIAC MARKERS ( 23 Apr 2019 18:20 )  x     / <0.01 ng/mL / x     / x     / x            Troponin trend:    Serum Pro-Brain Natriuretic Peptide: 4307 pg/mL (04-23-19 @ 18:20)      Hemoglobin A1C   Thyroid      RADIOLOGY:  -CXR:  < from: Xray Chest 1 View AP/PA (04.24.19 @ 06:35) >  Impression:      Cardiomegaly. Right midlung layering fluid.    < end of copied text >    < from: CT Angio Chest w/ IV Cont (04.23.19 @ 22:40) >    IMPRESSION:   1. Reflux of intravenous contrast into the IVC, cardiomegaly, trace   bilateral pleural effusions with interstitial edema; findings can be seen   with CHF.    2. No evidence of acute pulmonary embolismor acute intra-abdominal   pathology.    < end of copied text >    -CT:  < from: CT Head No Cont (04.24.19 @ 10:10) >    IMPRESSION:    1.No CT evidence of acute intracranial hemorrhage or large territory   infarct.    2.  Severe chronic microvascular changes.    3.  Right sphenoid sinus near complete opacification.    < end of copied text >    < from: US Abdomen Limited (04.24.19 @ 12:49) >    IMPRESSION:    No gallstones are seen.    Nonspecific gallbladder wall thickening 5.8 mm, suspect cardiogenic   etiology.    Small amount of right upper quadrant ascites.    < end of copied text >        ECG:  < from: 12 Lead ECG (04.23.19 @ 18:01) >  Diagnosis Line Atrial fibrillation with rapid ventricular response  Septal infarct , age undetermined  Abnormal ECG    < end of copied text >    TELEMETRY EVENTS:  brief episode of WCT- likely Afib with aberrancy

## 2019-04-24 NOTE — CONSULT NOTE ADULT - ASSESSMENT
89 yo female with pmh od dementia, CHF and newly diagnosed Afib presents with acute onset of confusion and weakness most likely due to hypotensive episode.  CTH is negative for acute ischemia. Patient was not a tPA candidate due to rapidly improving symptoms.    Plan:  Continue serial neurochecks  MRI brain NC  MRA head and neck  Avoid hypovolemia  Aspirin, Statin  Telemetry  Echo study      neuroattending note will follow

## 2019-04-24 NOTE — ED PROVIDER NOTE - PHYSICAL EXAMINATION
CONSTITUTIONAL: WA / WN / NAD  HEAD: NCAT  EYES: PERRL; EOMI;   ENT: Normal pharynx; mucous membranes pink/moist, no erythema.  NECK: Supple; no meningeal signs  CARD: tachycardic nl S1/S2; no M/R/G.   RESP: Respiratory rate and effort are normal; breath sounds clear and equal bilaterally.  ABD: Soft, NT ND   MSK/EXT: No gross deformities; full range of motion.  SKIN: Warm and dry;   NEURO: AAOx3  PSYCH: Memory Intact, Normal Affect

## 2019-04-24 NOTE — ED PROVIDER NOTE - CLINICAL SUMMARY MEDICAL DECISION MAKING FREE TEXT BOX
I personally evaluated the patient. I reviewed the Resident’s or Physician Assistant’s note (as assigned above), and agree with the findings and plan except as documented in my note. Patient rate controlled, admitted for further evaluation.

## 2019-04-25 LAB
ALBUMIN SERPL ELPH-MCNC: 3.7 G/DL — SIGNIFICANT CHANGE UP (ref 3.5–5.2)
ALP SERPL-CCNC: 225 U/L — HIGH (ref 30–115)
ALT FLD-CCNC: 76 U/L — HIGH (ref 0–41)
ANION GAP SERPL CALC-SCNC: 19 MMOL/L — HIGH (ref 7–14)
ANION GAP SERPL CALC-SCNC: 19 MMOL/L — HIGH (ref 7–14)
APTT BLD: 61.1 SEC — HIGH (ref 27–39.2)
APTT BLD: 91.2 SEC — CRITICAL HIGH (ref 27–39.2)
AST SERPL-CCNC: 70 U/L — HIGH (ref 0–41)
BASOPHILS # BLD AUTO: 0.02 K/UL — SIGNIFICANT CHANGE UP (ref 0–0.2)
BASOPHILS NFR BLD AUTO: 0.2 % — SIGNIFICANT CHANGE UP (ref 0–1)
BILIRUB SERPL-MCNC: 0.8 MG/DL — SIGNIFICANT CHANGE UP (ref 0.2–1.2)
BUN SERPL-MCNC: 28 MG/DL — HIGH (ref 10–20)
BUN SERPL-MCNC: 29 MG/DL — HIGH (ref 10–20)
CALCIUM SERPL-MCNC: 6.7 MG/DL — LOW (ref 8.5–10.1)
CALCIUM SERPL-MCNC: 6.9 MG/DL — LOW (ref 8.5–10.1)
CHLORIDE SERPL-SCNC: 94 MMOL/L — LOW (ref 98–110)
CHLORIDE SERPL-SCNC: 95 MMOL/L — LOW (ref 98–110)
CO2 SERPL-SCNC: 15 MMOL/L — LOW (ref 17–32)
CO2 SERPL-SCNC: 18 MMOL/L — SIGNIFICANT CHANGE UP (ref 17–32)
CREAT SERPL-MCNC: 1.4 MG/DL — SIGNIFICANT CHANGE UP (ref 0.7–1.5)
CREAT SERPL-MCNC: 1.5 MG/DL — SIGNIFICANT CHANGE UP (ref 0.7–1.5)
EOSINOPHIL # BLD AUTO: 0.03 K/UL — SIGNIFICANT CHANGE UP (ref 0–0.7)
EOSINOPHIL NFR BLD AUTO: 0.3 % — SIGNIFICANT CHANGE UP (ref 0–8)
GLUCOSE SERPL-MCNC: 112 MG/DL — HIGH (ref 70–99)
GLUCOSE SERPL-MCNC: 112 MG/DL — HIGH (ref 70–99)
HCT VFR BLD CALC: 36.5 % — LOW (ref 37–47)
HGB BLD-MCNC: 11.9 G/DL — LOW (ref 12–16)
IMM GRANULOCYTES NFR BLD AUTO: 0.5 % — HIGH (ref 0.1–0.3)
LYMPHOCYTES # BLD AUTO: 2.14 K/UL — SIGNIFICANT CHANGE UP (ref 1.2–3.4)
LYMPHOCYTES # BLD AUTO: 23.5 % — SIGNIFICANT CHANGE UP (ref 20.5–51.1)
MAGNESIUM SERPL-MCNC: 2.5 MG/DL — HIGH (ref 1.8–2.4)
MCHC RBC-ENTMCNC: 28.5 PG — SIGNIFICANT CHANGE UP (ref 27–31)
MCHC RBC-ENTMCNC: 32.6 G/DL — SIGNIFICANT CHANGE UP (ref 32–37)
MCV RBC AUTO: 87.5 FL — SIGNIFICANT CHANGE UP (ref 81–99)
MONOCYTES # BLD AUTO: 0.83 K/UL — HIGH (ref 0.1–0.6)
MONOCYTES NFR BLD AUTO: 9.1 % — SIGNIFICANT CHANGE UP (ref 1.7–9.3)
NEUTROPHILS # BLD AUTO: 6.05 K/UL — SIGNIFICANT CHANGE UP (ref 1.4–6.5)
NEUTROPHILS NFR BLD AUTO: 66.4 % — SIGNIFICANT CHANGE UP (ref 42.2–75.2)
NRBC # BLD: 0 /100 WBCS — SIGNIFICANT CHANGE UP (ref 0–0)
PLATELET # BLD AUTO: 237 K/UL — SIGNIFICANT CHANGE UP (ref 130–400)
POTASSIUM SERPL-MCNC: 4.7 MMOL/L — SIGNIFICANT CHANGE UP (ref 3.5–5)
POTASSIUM SERPL-MCNC: 5.4 MMOL/L — HIGH (ref 3.5–5)
POTASSIUM SERPL-SCNC: 4.7 MMOL/L — SIGNIFICANT CHANGE UP (ref 3.5–5)
POTASSIUM SERPL-SCNC: 5.4 MMOL/L — HIGH (ref 3.5–5)
PROT SERPL-MCNC: 5.7 G/DL — LOW (ref 6–8)
RBC # BLD: 4.17 M/UL — LOW (ref 4.2–5.4)
RBC # FLD: 15.2 % — HIGH (ref 11.5–14.5)
SODIUM SERPL-SCNC: 129 MMOL/L — LOW (ref 135–146)
SODIUM SERPL-SCNC: 131 MMOL/L — LOW (ref 135–146)
T4 FREE SERPL-MCNC: 1.9 NG/DL — HIGH (ref 0.9–1.8)
TSH SERPL-MCNC: 3.73 UIU/ML — SIGNIFICANT CHANGE UP (ref 0.27–4.2)
WBC # BLD: 9.12 K/UL — SIGNIFICANT CHANGE UP (ref 4.8–10.8)
WBC # FLD AUTO: 9.12 K/UL — SIGNIFICANT CHANGE UP (ref 4.8–10.8)

## 2019-04-25 PROCEDURE — 99232 SBSQ HOSP IP/OBS MODERATE 35: CPT

## 2019-04-25 PROCEDURE — 93306 TTE W/DOPPLER COMPLETE: CPT | Mod: 26

## 2019-04-25 PROCEDURE — 70544 MR ANGIOGRAPHY HEAD W/O DYE: CPT | Mod: 26,59

## 2019-04-25 PROCEDURE — 70547 MR ANGIOGRAPHY NECK W/O DYE: CPT | Mod: 26

## 2019-04-25 PROCEDURE — 70551 MRI BRAIN STEM W/O DYE: CPT | Mod: 26

## 2019-04-25 RX ORDER — FUROSEMIDE 40 MG
20 TABLET ORAL DAILY
Qty: 0 | Refills: 0 | Status: DISCONTINUED | OUTPATIENT
Start: 2019-04-25 | End: 2019-04-27

## 2019-04-25 RX ORDER — SIMETHICONE 80 MG/1
80 TABLET, CHEWABLE ORAL THREE TIMES A DAY
Qty: 0 | Refills: 0 | Status: DISCONTINUED | OUTPATIENT
Start: 2019-04-25 | End: 2019-04-27

## 2019-04-25 RX ORDER — APIXABAN 2.5 MG/1
2.5 TABLET, FILM COATED ORAL EVERY 12 HOURS
Qty: 0 | Refills: 0 | Status: DISCONTINUED | OUTPATIENT
Start: 2019-04-25 | End: 2019-04-27

## 2019-04-25 RX ORDER — METOPROLOL TARTRATE 50 MG
25 TABLET ORAL
Qty: 0 | Refills: 0 | Status: DISCONTINUED | OUTPATIENT
Start: 2019-04-25 | End: 2019-04-26

## 2019-04-25 RX ADMIN — Medication 25 MILLIGRAM(S): at 17:48

## 2019-04-25 RX ADMIN — Medication 100 MILLIGRAM(S): at 17:48

## 2019-04-25 RX ADMIN — Medication 20 MILLIGRAM(S): at 17:48

## 2019-04-25 RX ADMIN — PANTOPRAZOLE SODIUM 40 MILLIGRAM(S): 20 TABLET, DELAYED RELEASE ORAL at 06:29

## 2019-04-25 RX ADMIN — Medication 100 MILLIGRAM(S): at 06:29

## 2019-04-25 NOTE — PROGRESS NOTE ADULT - SUBJECTIVE AND OBJECTIVE BOX
Patient is a 89y old  Female who presents with a chief complaint of palpitations (24 Apr 2019 16:51)    HPI:  89 y.o. f w/ PMHx of HTN, DLD presents with a chief complaint of palpitations associated with shortness of breath. It started 4 days ago and progressively worsened since then. Patient denies ever having this before. Patient denies any fevers, chills, or night sweats. Patient denies any nausea, vomiting, or diarrhea.     In ER- EKG looked like SVT so was given adenosine 6, 12, then 18 with no improvement, then was determiend it was Afib w/ RVR and cardizem was given which brought the HR down to around 110's. (24 Apr 2019 02:25)      SUBJ:  Patient seen and examined.      MEDICATIONS  (STANDING):  chlorhexidine 2% Cloths 1 Application(s) Topical <User Schedule>  diltiazem    Tablet 30 milliGRAM(s) Oral every 8 hours  docusate sodium 100 milliGRAM(s) Oral two times a day  furosemide    Tablet 40 milliGRAM(s) Oral daily  heparin  Infusion 800 Unit(s)/Hr (8 mL/Hr) IV Continuous <Continuous>  pantoprazole    Tablet 40 milliGRAM(s) Oral before breakfast    MEDICATIONS  (PRN):            Vital Signs Last 24 Hrs  T(C): 36.7 (25 Apr 2019 06:06), Max: 36.7 (24 Apr 2019 23:30)  T(F): 98 (25 Apr 2019 06:06), Max: 98 (24 Apr 2019 23:30)  HR: 60 (25 Apr 2019 08:22) (60 - 69)  BP: 139/73 (25 Apr 2019 08:22) (60/45 - 139/73)  BP(mean): --  RR: 18 (25 Apr 2019 08:22) (17 - 20)  SpO2: 95% (25 Apr 2019 08:22) (95% - 99%)      PHYSICAL EXAM:    GEN: AAO x 3, NAD  HEENT: NC/AT, PERRL  Neck: No JVD, no bruits  CV: irreg, S1-S2  Lungs: + ronchi  Abd: Soft, non-tender  Ext: No edema      I&O's Summary    24 Apr 2019 07:01  -  25 Apr 2019 07:00  --------------------------------------------------------  IN: 64 mL / OUT: 0 mL / NET: 64 mL    	    TELEMETRY: AF w/ RVR    ECG:    TTE:      LABS:                        11.9   9.12  )-----------( 237      ( 25 Apr 2019 05:55 )             36.5     04-25    129<L>  |  95<L>  |  29<H>  ----------------------------<  112<H>  5.4<H>   |  15<L>  |  1.5    Ca    6.7<L>      25 Apr 2019 05:55  Mg     2.5     04-25    TPro  5.7<L>  /  Alb  3.7  /  TBili  0.8  /  DBili  x   /  AST  70<H>  /  ALT  76<H>  /  AlkPhos  225<H>  04-25    CARDIAC MARKERS ( 24 Apr 2019 11:07 )  x     / 0.01 ng/mL / x     / x     / 5.6 ng/mL  CARDIAC MARKERS ( 24 Apr 2019 07:42 )  x     / 0.01 ng/mL / x     / x     / x      CARDIAC MARKERS ( 23 Apr 2019 18:20 )  x     / <0.01 ng/mL / x     / x     / x          PT/INR - ( 24 Apr 2019 23:00 )   PT: 16.30 sec;   INR: 1.42 ratio         PTT - ( 25 Apr 2019 05:55 )  PTT:91.2 sec      BNP  RADIOLOGY & ADDITIONAL STUDIES:      IMPRESSION AND PLAN:

## 2019-04-25 NOTE — PROGRESS NOTE ADULT - ASSESSMENT
BHUMIKA GREENE 89y Female  MRN#: [id number]       SUBJECTIVE  Patient is a 89y old Female who presents with a chief complaint of palpitations (25 Apr 2019 15:07)  Currently admitted to medicine with the primary diagnosis of Afib with RVR and TIA     Today is hospital day 1d, examined at bedside this morning. Family present. Tolerating PO diet, no chest pain, no SOB at rest, describes some SOB while walking. Also endorses some abdominal flatulence     OBJECTIVE  Home Medications:  Calcium 500+D oral tablet, chewable: 1 tab(s) orally 2 times a day (24 Apr 2019 02:28)  Colace 100 mg oral capsule: 1 cap(s) orally 2 times a day (24 Apr 2019 02:28)  metoprolol succinate 25 mg oral tablet, extended release: 1 tab(s) orally once a day (24 Apr 2019 02:28)  PriLOSEC 20 mg oral delayed release capsule: 1 cap(s) orally once a day (24 Apr 2019 02:28)    MEDICATIONS:  STANDING MEDICATIONS  chlorhexidine 2% Cloths 1 Application(s) Topical <User Schedule>  docusate sodium 100 milliGRAM(s) Oral two times a day  furosemide    Tablet 20 milliGRAM(s) Oral daily  heparin  Infusion 800 Unit(s)/Hr IV Continuous <Continuous>  metoprolol tartrate 25 milliGRAM(s) Oral two times a day  pantoprazole    Tablet 40 milliGRAM(s) Oral before breakfast    PRN MEDICATIONS      VITAL SIGNS: Last 24 Hours  T(C): 36.3 (25 Apr 2019 14:20), Max: 36.7 (24 Apr 2019 23:30)  T(F): 97.4 (25 Apr 2019 14:20), Max: 98 (24 Apr 2019 23:30)  HR: 126 (25 Apr 2019 14:20) (60 - 126)  BP: 134/84 (25 Apr 2019 14:20) (81/61 - 139/73)  BP(mean): --  RR: 18 (25 Apr 2019 14:20) (18 - 18)  SpO2: 95% (25 Apr 2019 08:22) (95% - 99%)    LABS:                        11.9   9.12  )-----------( 237      ( 25 Apr 2019 05:55 )             36.5     04-25    129<L>  |  95<L>  |  29<H>  ----------------------------<  112<H>  5.4<H>   |  15<L>  |  1.5    Ca    6.7<L>      25 Apr 2019 05:55  Mg     2.5     04-25    TPro  5.7<L>  /  Alb  3.7  /  TBili  0.8  /  DBili  x   /  AST  70<H>  /  ALT  76<H>  /  AlkPhos  225<H>  04-25    LIVER FUNCTIONS - ( 25 Apr 2019 05:55 )  Alb: 3.7 g/dL / Pro: 5.7 g/dL / ALK PHOS: 225 U/L / ALT: 76 U/L / AST: 70 U/L / GGT: x           PT/INR - ( 24 Apr 2019 23:00 )   PT: 16.30 sec;   INR: 1.42 ratio         PTT - ( 25 Apr 2019 05:55 )  PTT:91.2 sec      CARDIAC MARKERS ( 24 Apr 2019 11:07 )  x     / 0.01 ng/mL / x     / x     / 5.6 ng/mL  CARDIAC MARKERS ( 24 Apr 2019 07:42 )  x     / 0.01 ng/mL / x     / x     / x      CARDIAC MARKERS ( 23 Apr 2019 18:20 )  x     / <0.01 ng/mL / x     / x     / x          RADIOLOGY:  < from: CT Head No Cont (04.24.19 @ 10:10) >  IMPRESSION:    1.No CT evidence of acute intracranial hemorrhage or large territory   infarct.    2.  Severe chronic microvascular changes.    3.  Right sphenoid sinus near complete opacification.      < end of copied text >      PHYSICAL EXAM:    GENERAL: NAD, looks stated age, AAOx2  HEENT:  Atraumatic, Normocephalic. EOMI, PERRLA, conjunctiva clear, No JVD  PULMONARY: Clear to auscultation bilaterally; No wheeze no crackles  CARDIOVASCULAR: irregular rate and rhythm; No murmurs, rubs, or gallops  GASTROINTESTINAL: Soft, Nontender, Nondistended; Bowel sounds present  MUSCULOSKELETAL: warm, well perfused, 1+ pedal edema up to mid-shin  NEUROLOGY: non-focal,  strength 4/5, ambulates with assistance  SKIN: No rashes or lesions        ASSESSMENT & PLAN  89 y.o. f w/ PMHx of HTN, DLD presents with a chief complaint of palpitations associated with shortness of breath.    #) New Onset Afib with RVR- still afib  - switch cardizem to metoprolol 25mg PO BID  - on heparin infusion, will switch to eliquis for AC if MRI Brain neg  - normal TSH, borderline elevated F4  - cw tele monitoring  -cardio following    #) CHF  -imaging suggestive of CHF  -B/L LE edema, CT chest - congestion with effusion -   - FU 2D echo  - gentle diuresis, cw lasix 20 q daily    #TIA  CT Head neg for stroke  MRI/MRA Head and Neck completed- read pending  check lipid panel and HbA1c  avoid hypotension and hypovolemia  neuro following    #Electrolyte imbalance- hypervolemic hyponatremia, hyperkalemia  monitor electrolytes, carlos with diuresis  BMP in AM     #Acute transaminitis- possibly due to congestion from CHF exacerbation  Liver US: nonspecific mild gallbladder wall edema  monitor LFTs     #MARIO- ? due to diuresis vs cardiorenal syndrome  no hydronephrosis on prior Abd US  monitor electrolytes and renal function  avoid nephrotoxic agents  strict I and Os    #Normocytic anemia  - will obtain anemia work up  - FU H/H    #) HTN  - cw metoprolol    #) DVT/ GI ppx  -eliquis, protonix    #) Code Status  -full code    #) Dispo  -from home, just came from  1 week ago, living with daughters here now  -ambulates with wheelchair  -Physiatry/ PT eval c/s placed

## 2019-04-25 NOTE — PROGRESS NOTE ADULT - SUBJECTIVE AND OBJECTIVE BOX
RAMONA BHUMIKA  [facility] T5-3C 031 C ([facility] T5-3C)            Patient was evaluated and examined  by bedside, remains tachycardic, persistent legs edema, c/o abdominal flatulence, no cough, no dyspnea at rest.        REVIEW OF SYSTEMS:  please see pertinent positives mentioned above, all other 12 ROS negative      T(C): , Max: 36.7 (04-24-19 @ 23:30)  HR: 126 (04-25-19 @ 14:20)  BP: 134/84 (04-25-19 @ 14:20)  RR: 18 (04-25-19 @ 14:20)  SpO2: 95% (04-25-19 @ 08:22)  CAPILLARY BLOOD GLUCOSE          PHYSICAL EXAM:  General: NAD, AAOX3, patient is laying comfortably in bed  HEENT: AT, NC, Supple, NO JVD, NO CB  Lungs: mild decreased breath sounds B/L, no wheezing, no rhonchi  CVS: normal S1, S2, tachy RRR, NO M/G/R  Abdomen: soft, bowel sounds present, non-tender, non-distended  Extremities: plus 1 pitting lower ext b/l edema, no clubbing, no cyanosis, positive peripheral pulses b/l  Neuro: no acute focal neurological deficits, generalized body weakness  Skin: no rush, no ecchymosis      LAB  CBC  Date: 04-25-19 @ 05:55  Mean cell Jvsjrxwzor65.5  Mean cell Hemoglobin Conc32.6  Mean cell Volum 87.5  Platelet count-Automate 237  RBC Count 4.17  Red Cell Distrib Width15.2  WBC Count9.12  % Albumin, Urine--  Hematocrit 36.5  Hemoglobin 11.9  CBC  Date: 04-24-19 @ 07:42  Mean cell Ofnsppalmc46.3  Mean cell Hemoglobin Conc32.7  Mean cell Volum 86.5  Platelet count-Automate 255  RBC Count 4.00  Red Cell Distrib Width15.0  WBC Count8.04  % Albumin, Urine--  Hematocrit 34.6  Hemoglobin 11.3  CBC  Date: 04-23-19 @ 18:20  Mean cell Cehdazuoxk76.4  Mean cell Hemoglobin Conc32.9  Mean cell Volum 86.3  Platelet count-Automate 273  RBC Count 4.54  Red Cell Distrib Width14.8  WBC Count8.78  % Albumin, Urine--  Hematocrit 39.2  Hemoglobin 12.9    BMP  04-25-19 @ 05:55  Blood Gas Arterial-Calcium,Ionized--  Blood Urea Nitrogen, Serum 29 mg/dL<H> [10 - 20]  Carbon Dioxide, Serum15 mmol/L<L> [17 - 32]  Chloride, Serum95 mmol/L<L> [98 - 110]  Creatinie, Serum1.5 mg/dL [0.7 - 1.5]  Glucose, Szvkf083 mg/dL<H> [70 - 99]  Potassium, Serum5.4 mmol/L<H> [3.5 - 5.0]  Sodium, Serum 129 mmol/L<L> [135 - 146]  [facility]  04-24-19 @ 07:42  Blood Gas Arterial-Calcium,Ionized--  Blood Urea Nitrogen, Serum 25 mg/dL<H> [10 - 20]  Carbon Dioxide, Serum21 mmol/L [17 - 32]  Chloride, Serum94 mmol/L<L> [98 - 110]  Creatinie, Serum1.0 mg/dL [0.7 - 1.5]  Glucose, Hccfj214 mg/dL<H> [70 - 99]  Potassium, Serum4.4 mmol/L [3.5 - 5.0]  Sodium, Serum 130 mmol/L<L> [135 - 146]  [facility]  04-23-19 @ 18:20  Blood Gas Arterial-Calcium,Ionized--  Blood Urea Nitrogen, Serum 29 mg/dL<H> [10 - 20]  Carbon Dioxide, Serum20 mmol/L [17 - 32]  Chloride, Serum93 mmol/L<L> [98 - 110]  Creatinie, Serum1.1 mg/dL [0.7 - 1.5]  Glucose, Zpezd839 mg/dL<H> [70 - 99]  Potassium, Serum4.9 mmol/L [3.5 - 5.0]  Sodium, Serum 130 mmol/L<L> [135 - 146]        PT/INR - ( 24 Apr 2019 23:00 )   PT: 16.30 sec;   INR: 1.42 ratio         PTT - ( 25 Apr 2019 05:55 )  PTT:91.2 sec          Medications:  chlorhexidine 2% Cloths 1 Application(s) Topical <User Schedule>  docusate sodium 100 milliGRAM(s) Oral two times a day  furosemide    Tablet 20 milliGRAM(s) Oral daily  heparin  Infusion 800 Unit(s)/Hr IV Continuous <Continuous>  metoprolol tartrate 25 milliGRAM(s) Oral two times a day  pantoprazole    Tablet 40 milliGRAM(s) Oral before breakfast        Assessment and Plan:  Patient is a 89 y.o. female  with  PMH.  of HTN, DLD presents with a chief complaint of palpitations associated with shortness of breath.    #) New Onset Afib  -admit to tele  -metoprolol 25mg po twice daily  -on heparin infusion tx.   -will switch to eliquis for AC, if MRI Of brain neg.  -normal TSH, borderline elev. F4  -trops negative   -will continue to adjust doses to optimize hr control  -continue cardiac monitoring    #) Dyspnea on admission due to suspected acute decompensated CHF ? EF  -B/L LE edema, CT chest - congestion with effusion  -f/up 2D echo  -started gentle diuresis lasix 20 mg daily    #) Electrolyte imbalance - hypervolemic  hyponatremia ? volume od status  -continue close electrolytes monitoring during diuresis tx.  -BMP in am    #) Acute Transaminitis- ? due to congestion with chf decompensation  - liver US - non-spec. mild gallbladder wall edema  -monitor LFT's    #) MARIO- vasomotor nephropathy in CHF  - monitor electrolytes and renal function  -avoid nephrotoxic agents  -strict I and O chart    #) Normocytic anemia  -obtain anemia work up  -f/up H/H in am    #) HTN  -c/w home med (metoprolol)    #) DVT/ GI ppx  -pt. is on hep. drip, protonix    #) Code Status  -full code    #Progress Note Handoff: Pending Consults_________,Tests________,Test Results_______,Other; f/up MRI of brain -if normal -d/c hep. drip, start Eliquis tx., monitor HR - adjust meds   Family discussion: pt and pt's family by bedside with translation Disposition: Home__xx

## 2019-04-25 NOTE — PROGRESS NOTE ADULT - ASSESSMENT
Would switch Heparin to Eliquis 2.5 mg q12, if ok with neurology.  HR is elevated, re-start BB, c/w PO Cardizem. Would not use IV Cardizem, given the prior episode of hypotension with it. If HR is still uncontrolled after re-starting BB, will titrate PO Cardizem.  Correct electrolytes.  ? Etiology of metabolic acidosis - w/u as per primary team. F/u TST, T4. Repeat BMP  2D echo pending.

## 2019-04-25 NOTE — PHYSICAL THERAPY INITIAL EVALUATION ADULT - SPECIFY REASON(S)
Pt not in room. As per RN, pt went for CT scan 2* stroke code. PT to f/u when appropriate.
Unable to perform PT eval as patient UA/ OOR for procedure . Will f/u as appropriate

## 2019-04-25 NOTE — PROGRESS NOTE ADULT - ASSESSMENT
Impression:  89 yo female with pmh od dementia, CHF and newly diagnosed Afib presents with acute onset of confusion and weakness most likely due to hypotensive episode.    Plan:  MRI brain  MRA head and neck  2d echo  avoid hypotension and hypovolemia  If MRI brain negative for hemosiderin, OK for Eliquis. Impression:  89 yo female with pmh od dementia, CHF and newly diagnosed Afib presents with acute onset of confusion and weakness most likely due to hypotensive episode.    Plan:  MRI brain without carlos  MRA head and neck  2d echo  avoid hypotension and hypovolemia  If MRI brain negative for hemosiderin, OK for Eliquis.  check lipid panel and A1C

## 2019-04-25 NOTE — PROGRESS NOTE ADULT - SUBJECTIVE AND OBJECTIVE BOX
HPI:  89 y.o. f w/ PMHx of HTN, DLD presents with a chief complaint of palpitations associated with shortness of breath. It started 4 days ago and progressively worsened since then. Patient denies ever having this before. Patient denies any fevers, chills, or night sweats. Patient denies any nausea, vomiting, or diarrhea.     In ER- EKG looked like SVT so was given adenosine 6, 12, then 18 with no improvement, then was determiend it was Afib w/ RVR and cardizem was given which brought the HR down to around 110's. (24 Apr 2019 02:25)    Stroke code was called when patient became hypotensive 60/40 and became confused and lethargic with questionable right facial droop.  Upon exam patient had difficulty answering questions and following commands, and had some weakness on the right upper and lower extremity.    PAST MEDICAL & SURGICAL HISTORY:  Dyslipidemia  Hypertension  No significant past surgical history    Home Medications:  Calcium 500+D oral tablet, chewable: 1 tab(s) orally 2 times a day (24 Apr 2019 02:28)  Colace 100 mg oral capsule: 1 cap(s) orally 2 times a day (24 Apr 2019 02:28)  metoprolol succinate 25 mg oral tablet, extended release: 1 tab(s) orally once a day (24 Apr 2019 02:28)  PriLOSEC 20 mg oral delayed release capsule: 1 cap(s) orally once a day (24 Apr 2019 02:28)      Vital Signs Last 24 Hrs  T(C): 36.7 (25 Apr 2019 06:06), Max: 36.7 (24 Apr 2019 23:30)  T(F): 98 (25 Apr 2019 06:06), Max: 98 (24 Apr 2019 23:30)  HR: 60 (25 Apr 2019 08:22) (60 - 63)  BP: 139/73 (25 Apr 2019 08:22) (81/61 - 139/73)  BP(mean): --  RR: 18 (25 Apr 2019 08:22) (17 - 18)  SpO2: 95% (25 Apr 2019 08:22) (95% - 99%)    NIHSS:     LOC a0 b2 c0  Gaze 0  Facial 0  Visual 0  Motor Arm 0  Motor Leg 0  Sensory 0  Ataxia 0  Language 0  Dysarthria 1  Extinction 0  Total: 3    Allergies    No Known Allergies    Intolerances      MEDICATIONS  (STANDING):  chlorhexidine 2% Cloths 1 Application(s) Topical <User Schedule>  docusate sodium 100 milliGRAM(s) Oral two times a day  furosemide    Tablet 20 milliGRAM(s) Oral daily  heparin  Infusion 800 Unit(s)/Hr (8 mL/Hr) IV Continuous <Continuous>  metoprolol tartrate 25 milliGRAM(s) Oral two times a day  pantoprazole    Tablet 40 milliGRAM(s) Oral before breakfast    MEDICATIONS  (PRN):      LABS:                        11.9   9.12  )-----------( 237      ( 25 Apr 2019 05:55 )             36.5     04-25    129<L>  |  95<L>  |  29<H>  ----------------------------<  112<H>  5.4<H>   |  15<L>  |  1.5    Ca    6.7<L>      25 Apr 2019 05:55  Mg     2.5     04-25    TPro  5.7<L>  /  Alb  3.7  /  TBili  0.8  /  DBili  x   /  AST  70<H>  /  ALT  76<H>  /  AlkPhos  225<H>  04-25    PT/INR - ( 24 Apr 2019 23:00 )   PT: 16.30 sec;   INR: 1.42 ratio         PTT - ( 25 Apr 2019 05:55 )  PTT:91.2 sec        Neuro Imaging:    < from: CT Head No Cont (04.24.19 @ 10:10) >  The ventricles and sulci are compatible a mild to moderate degree of   cerebral volume loss..    Gray-white matter differentiation is preserved.    Confluent hypoattenuation within the cerebral hemispheric white matter   likely on the basis of chronic microvascular change.    No CT evidence of acute intracranial hemorrhage, mass effect or midline   shift.    No depressed skull fracture. There is near complete opacification of the   right sphenoid sinus. The remaining visualized paranasal sinuses and   mastoids are unremarkable.      IMPRESSION:    1.No CT evidence of acute intracranial hemorrhage or large territory   infarct.    2.  Severe chronic microvascular changes.    3.  Right sphenoid sinus near complete opacification.    < end of copied text >

## 2019-04-26 ENCOUNTER — TRANSCRIPTION ENCOUNTER (OUTPATIENT)
Age: 84
End: 2019-04-26

## 2019-04-26 LAB
ALBUMIN SERPL ELPH-MCNC: 3.6 G/DL — SIGNIFICANT CHANGE UP (ref 3.5–5.2)
ALP SERPL-CCNC: 228 U/L — HIGH (ref 30–115)
ALT FLD-CCNC: 72 U/L — HIGH (ref 0–41)
ANION GAP SERPL CALC-SCNC: 15 MMOL/L — HIGH (ref 7–14)
AST SERPL-CCNC: 55 U/L — HIGH (ref 0–41)
BILIRUB SERPL-MCNC: 0.9 MG/DL — SIGNIFICANT CHANGE UP (ref 0.2–1.2)
BUN SERPL-MCNC: 27 MG/DL — HIGH (ref 10–20)
CALCIUM SERPL-MCNC: 6.9 MG/DL — LOW (ref 8.5–10.1)
CHLORIDE SERPL-SCNC: 96 MMOL/L — LOW (ref 98–110)
CHOLEST SERPL-MCNC: 102 MG/DL — SIGNIFICANT CHANGE UP (ref 100–200)
CO2 SERPL-SCNC: 21 MMOL/L — SIGNIFICANT CHANGE UP (ref 17–32)
CREAT SERPL-MCNC: 1.4 MG/DL — SIGNIFICANT CHANGE UP (ref 0.7–1.5)
ESTIMATED AVERAGE GLUCOSE: 140 MG/DL — HIGH (ref 68–114)
GLUCOSE SERPL-MCNC: 95 MG/DL — SIGNIFICANT CHANGE UP (ref 70–99)
HBA1C BLD-MCNC: 6.5 % — HIGH (ref 4–5.6)
HCT VFR BLD CALC: 36.8 % — LOW (ref 37–47)
HDLC SERPL-MCNC: 53 MG/DL — SIGNIFICANT CHANGE UP
HGB BLD-MCNC: 12.3 G/DL — SIGNIFICANT CHANGE UP (ref 12–16)
IRON SATN MFR SERPL: 10 % — LOW (ref 15–50)
IRON SATN MFR SERPL: 36 UG/DL — SIGNIFICANT CHANGE UP (ref 35–150)
LDH SERPL L TO P-CCNC: 323 — HIGH (ref 50–242)
LIPID PNL WITH DIRECT LDL SERPL: 45 MG/DL — SIGNIFICANT CHANGE UP (ref 4–129)
MAGNESIUM SERPL-MCNC: 2.2 MG/DL — SIGNIFICANT CHANGE UP (ref 1.8–2.4)
MCHC RBC-ENTMCNC: 28.7 PG — SIGNIFICANT CHANGE UP (ref 27–31)
MCHC RBC-ENTMCNC: 33.4 G/DL — SIGNIFICANT CHANGE UP (ref 32–37)
MCV RBC AUTO: 86 FL — SIGNIFICANT CHANGE UP (ref 81–99)
NRBC # BLD: 0 /100 WBCS — SIGNIFICANT CHANGE UP (ref 0–0)
PLATELET # BLD AUTO: 274 K/UL — SIGNIFICANT CHANGE UP (ref 130–400)
POTASSIUM SERPL-MCNC: 4.9 MMOL/L — SIGNIFICANT CHANGE UP (ref 3.5–5)
POTASSIUM SERPL-SCNC: 4.9 MMOL/L — SIGNIFICANT CHANGE UP (ref 3.5–5)
PROT SERPL-MCNC: 5.8 G/DL — LOW (ref 6–8)
RBC # BLD: 4.28 M/UL — SIGNIFICANT CHANGE UP (ref 4.2–5.4)
RBC # BLD: 4.28 M/UL — SIGNIFICANT CHANGE UP (ref 4.2–5.4)
RBC # FLD: 15.5 % — HIGH (ref 11.5–14.5)
RETICS #: 137.4 K/UL — HIGH (ref 25–125)
RETICS/RBC NFR: 3.2 % — HIGH (ref 0.5–1.5)
SODIUM SERPL-SCNC: 132 MMOL/L — LOW (ref 135–146)
TIBC SERPL-MCNC: 352 UG/DL — SIGNIFICANT CHANGE UP (ref 220–430)
TOTAL CHOLESTEROL/HDL RATIO MEASUREMENT: 1.9 RATIO — LOW (ref 4–5.5)
TRANSFERRIN SERPL-MCNC: 297 MG/DL — SIGNIFICANT CHANGE UP (ref 200–360)
TRIGL SERPL-MCNC: 56 MG/DL — SIGNIFICANT CHANGE UP (ref 10–149)
UIBC SERPL-MCNC: 316 UG/DL — SIGNIFICANT CHANGE UP (ref 110–370)
WBC # BLD: 9.17 K/UL — SIGNIFICANT CHANGE UP (ref 4.8–10.8)
WBC # FLD AUTO: 9.17 K/UL — SIGNIFICANT CHANGE UP (ref 4.8–10.8)

## 2019-04-26 PROCEDURE — 71045 X-RAY EXAM CHEST 1 VIEW: CPT | Mod: 26

## 2019-04-26 PROCEDURE — 99232 SBSQ HOSP IP/OBS MODERATE 35: CPT

## 2019-04-26 RX ORDER — DOCUSATE SODIUM 100 MG
1 CAPSULE ORAL
Qty: 0 | Refills: 0 | COMMUNITY

## 2019-04-26 RX ORDER — METOPROLOL TARTRATE 50 MG
1 TABLET ORAL
Qty: 30 | Refills: 0 | OUTPATIENT
Start: 2019-04-26 | End: 2019-05-25

## 2019-04-26 RX ORDER — METOPROLOL TARTRATE 50 MG
100 TABLET ORAL DAILY
Qty: 0 | Refills: 0 | Status: DISCONTINUED | OUTPATIENT
Start: 2019-04-27 | End: 2019-04-27

## 2019-04-26 RX ORDER — METOPROLOL TARTRATE 50 MG
50 TABLET ORAL DAILY
Qty: 0 | Refills: 0 | Status: DISCONTINUED | OUTPATIENT
Start: 2019-04-26 | End: 2019-04-26

## 2019-04-26 RX ORDER — METOPROLOL TARTRATE 50 MG
50 TABLET ORAL
Qty: 0 | Refills: 0 | Status: DISCONTINUED | OUTPATIENT
Start: 2019-04-26 | End: 2019-04-26

## 2019-04-26 RX ORDER — FUROSEMIDE 40 MG
20 TABLET ORAL ONCE
Qty: 0 | Refills: 0 | Status: DISCONTINUED | OUTPATIENT
Start: 2019-04-26 | End: 2019-04-26

## 2019-04-26 RX ORDER — APIXABAN 2.5 MG/1
1 TABLET, FILM COATED ORAL
Qty: 0 | Refills: 0 | COMMUNITY
Start: 2019-04-26

## 2019-04-26 RX ORDER — METOPROLOL TARTRATE 50 MG
1 TABLET ORAL
Qty: 0 | Refills: 0 | COMMUNITY

## 2019-04-26 RX ORDER — FUROSEMIDE 40 MG
1 TABLET ORAL
Qty: 0 | Refills: 0 | COMMUNITY
Start: 2019-04-26

## 2019-04-26 RX ADMIN — APIXABAN 2.5 MILLIGRAM(S): 2.5 TABLET, FILM COATED ORAL at 06:04

## 2019-04-26 RX ADMIN — Medication 25 MILLIGRAM(S): at 06:04

## 2019-04-26 RX ADMIN — APIXABAN 2.5 MILLIGRAM(S): 2.5 TABLET, FILM COATED ORAL at 17:44

## 2019-04-26 RX ADMIN — PANTOPRAZOLE SODIUM 40 MILLIGRAM(S): 20 TABLET, DELAYED RELEASE ORAL at 06:04

## 2019-04-26 RX ADMIN — Medication 100 MILLIGRAM(S): at 06:04

## 2019-04-26 RX ADMIN — Medication 20 MILLIGRAM(S): at 06:04

## 2019-04-26 NOTE — PROGRESS NOTE ADULT - SUBJECTIVE AND OBJECTIVE BOX
Patient is a 89y old  Female who presents with a chief complaint of palpitations (25 Apr 2019 16:03)    HPI:  89 y.o. f w/ PMHx of HTN, DLD presents with a chief complaint of palpitations associated with shortness of breath. It started 4 days ago and progressively worsened since then. Patient denies ever having this before. Patient denies any fevers, chills, or night sweats. Patient denies any nausea, vomiting, or diarrhea.     In ER- EKG looked like SVT so was given adenosine 6, 12, then 18 with no improvement, then was determiend it was Afib w/ RVR and cardizem was given which brought the HR down to around 110's. (24 Apr 2019 02:25)      SUBJ:  Patient seen and examined. Remains in AF. Rate is better this AM.      MEDICATIONS  (STANDING):  apixaban 2.5 milliGRAM(s) Oral every 12 hours  chlorhexidine 2% Cloths 1 Application(s) Topical <User Schedule>  docusate sodium 100 milliGRAM(s) Oral two times a day  furosemide    Tablet 20 milliGRAM(s) Oral daily  metoprolol tartrate 25 milliGRAM(s) Oral two times a day  pantoprazole    Tablet 40 milliGRAM(s) Oral before breakfast    MEDICATIONS  (PRN):  simethicone 80 milliGRAM(s) Chew three times a day PRN Gas            Vital Signs Last 24 Hrs  T(C): 35.9 (26 Apr 2019 05:41), Max: 36.8 (25 Apr 2019 20:28)  T(F): 96.7 (26 Apr 2019 05:41), Max: 98.2 (25 Apr 2019 20:28)  HR: 123 (26 Apr 2019 05:41) (86 - 126)  BP: 115/77 (26 Apr 2019 05:41) (95/65 - 134/84)  BP(mean): --  RR: 18 (26 Apr 2019 05:41) (18 - 18)  SpO2: 95% (26 Apr 2019 06:23) (95% - 95%)      PHYSICAL EXAM:    GEN: AAO x 3, NAD  HEENT: NC/AT  Neck: No JVD  CV: irreg, S1-S2  Lungs: CTAB  Abd: Soft, non-tender        I&O's Summary    25 Apr 2019 07:01  -  26 Apr 2019 07:00  --------------------------------------------------------  IN: 550 mL / OUT: 250 mL / NET: 300 mL    26 Apr 2019 07:01  -  26 Apr 2019 08:46  --------------------------------------------------------  IN: 260 mL / OUT: 0 mL / NET: 260 mL    	    TELEMETRY:    ECG:    TTE:  < from: Transthoracic Echocardiogram (04.25.19 @ 11:14) >    Summary:   1. Moderate to severe right atrial enlargement.   2. LV Ejection Fraction by Novoa's Method with a biplane EF of 44 %.   3. Mildly increased LV wall thickness.   4. Mild to moderate mitral valve regurgitation.   5. Thickening of the anterior and posterior mitral valve leaflets.   6. Moderate-severe tricuspid regurgitation.   7. Estimated pulmonary artery systolic pressure is 43.4 mmHg assuming a   right atrial pressure of 15 mmHg, which is consistent with mild pulmonary   hypertension.      < end of copied text >      LABS:                        12.3   9.17  )-----------( 274      ( 26 Apr 2019 06:52 )             36.8     04-26    132<L>  |  96<L>  |  27<H>  ----------------------------<  95  4.9   |  21  |  1.4    Ca    6.9<L>      26 Apr 2019 06:52  Mg     2.2     04-26    TPro  5.8<L>  /  Alb  3.6  /  TBili  0.9  /  DBili  x   /  AST  55<H>  /  ALT  72<H>  /  AlkPhos  228<H>  04-26    CARDIAC MARKERS ( 24 Apr 2019 11:07 )  x     / 0.01 ng/mL / x     / x     / 5.6 ng/mL      PT/INR - ( 24 Apr 2019 23:00 )   PT: 16.30 sec;   INR: 1.42 ratio         PTT - ( 25 Apr 2019 15:35 )  PTT:61.1 sec      BNP  RADIOLOGY & ADDITIONAL STUDIES:      IMPRESSION AND PLAN:

## 2019-04-26 NOTE — DISCHARGE NOTE NURSING/CASE MANAGEMENT/SOCIAL WORK - NSDCDPATPORTLINK_GEN_ALL_CORE
You can access the [facility] Portal, offered by [facility], by registering with the following website: [email or web address]

## 2019-04-26 NOTE — DISCHARGE NOTE PROVIDER - NSDCCPCAREPLAN_GEN_ALL_CORE_FT
PRINCIPAL DISCHARGE DIAGNOSIS  Diagnosis: Afib  Assessment and Plan of Treatment: New onset. Rate controlled with metoprolol.   Started on blood thinner, Eliquis to prevent blood clots from forming.   Please follow up with your cardiologist to monitor heart rate and continuation of blood thinners.         SECONDARY DISCHARGE DIAGNOSES  Diagnosis: Transaminitis  Assessment and Plan of Treatment: Your liver enzymes were found to be slightly elevated on admission. This may be due to your episode of hypotension. They were improving on discharge.   Please follow up with your PMD within 1 week to repeat lab work to check LFTs (liver funtion tests)    Diagnosis: Dysarthria  Assessment and Plan of Treatment: During your admission, your blood pressure dropped and you had a few moments where you had difficulty speaking.   Your had a cat scan and MRI of your head, which did not indicate that you had a stroke. Your lipid levels were within normal limits (LDL 45) and HbA1c 6.5. Given your age, the decision was made to hold off on statin therapy or diabetic medications at this time.   Please follow up with your PMD within 1 week and follow up with the neurologist.    Diagnosis: Acute HFrEF (heart failure with reduced ejection fraction)  Assessment and Plan of Treatment: Echocardiogram in house showed EF40% with severe TR.   Your symptoms improved with diuresis and you will continue to take lasix at home  Please discuss your lasix dosing and starting another medication, called lisinopril, with your doctor as these can be beneficil for your heart   Please continue to check your weights daily. Your weight on discharge is 153.6 pounds. If you gain a significant amount of weight in a short period of time, or your have worsening shortness of breath, or your legs appear more swollen all of a sudden, please call your cardiologist or go to the hospital.

## 2019-04-26 NOTE — DISCHARGE NOTE PROVIDER - CARE PROVIDER_API CALL
Rolly Ruffin (MD)  Cardiovascular Disease; Internal Medicine; Interventional Cardiology  [address], [address]  [place] [address]  Phone: [phone]  Fax: [phone]  Follow Up Time:     Michael Fierro)  Neurology  [address], [address]  [place] [address]  Phone: [phone]  Fax: [phone]  Follow Up Time:

## 2019-04-26 NOTE — PROGRESS NOTE ADULT - ATTENDING COMMENTS
Pt seen and examined at bedside independently  I agree with the above plan   Pt likely DC tomm, may need home o2   If pt saturation improves in am can be DCed.     #Progress Note Handoff  Pending (specify):  Consults_cardio________, Tests________, Test Results_______, Other_________  Family discussion: dw pt and family at bedside and agreed to plan   Disposition: Home__x with vna_/SNF___/Other________/Unknown at this time________
Patient examined in presence of family members this afternoon.  She has mild facial asymmetry.  MRI brain was negative for acute stroke.  She has new onset a-fib.  Eliquis begun. MRI brain was negative for acute stroke.  Gradient imaging reviewed and shows no evidence for microbleed.    Impression:  TIA, resolving deficit Likely secondary to atrial fibrillation now treated.    Plan:  1.  Continue Eliquis 2.5 mg bid.  2.  Statin use may not be warranted in her age group.  3.  PT/OT/Speech therapy.

## 2019-04-26 NOTE — PROGRESS NOTE ADULT - ASSESSMENT
AF - rate control  Increase metoprolol to 50 mg.  Systolic CHF - EF 40%  Severe TR - likely hepatic congestion  Electrolytes improved.  C/w low dose Lasix.  On anticoagulation.  OOB, consider PT.  No plans for cardioversion.  D/c planning as per primary team, if stable.

## 2019-04-26 NOTE — DISCHARGE NOTE PROVIDER - NSDCFUADDAPPT_GEN_ALL_CORE_FT
1. Follow up with your PMD within 1 week to repeat LFTs and BMP  2. Follow up with cardiologist within 1-2 weeks.

## 2019-04-26 NOTE — DISCHARGE NOTE PROVIDER - CARE PROVIDERS DIRECT ADDRESSES
[email or web address]

## 2019-04-26 NOTE — PROGRESS NOTE ADULT - ASSESSMENT
BHUMIKA GREENE 89y Female  MRN#: [id number]     SUBJECTIVE  Patient is a 89y old Female who presents with a chief complaint of palpitations (25 Apr 2019 15:07)  Currently admitted to medicine with the primary diagnosis of Afib with RVR and TIA     Today is hospital day 2d,     OBJECTIVE  Home Medications:  apixaban 2.5 mg oral tablet: 1 tab(s) orally every 12 hours (26 Apr 2019 13:38)  Calcium 500+D oral tablet, chewable: 1 tab(s) orally 2 times a day (24 Apr 2019 02:28)  Colace 100 mg oral capsule: 1 cap(s) orally 2 times a day prn for constipation (26 Apr 2019 13:38)  furosemide 20 mg oral tablet: 1 tab(s) orally once a day (26 Apr 2019 13:38)  PriLOSEC 20 mg oral delayed release capsule: 1 cap(s) orally once a day (24 Apr 2019 02:28)    MEDICATIONS:  STANDING MEDICATIONS  apixaban 2.5 milliGRAM(s) Oral every 12 hours  chlorhexidine 2% Cloths 1 Application(s) Topical <User Schedule>  docusate sodium 100 milliGRAM(s) Oral two times a day  furosemide    Tablet 20 milliGRAM(s) Oral daily  pantoprazole    Tablet 40 milliGRAM(s) Oral before breakfast    PRN MEDICATIONS  simethicone 80 milliGRAM(s) Chew three times a day PRN      VITAL SIGNS: Last 24 Hours  T(C): 36.3 (26 Apr 2019 14:06), Max: 36.8 (25 Apr 2019 20:28)  T(F): 97.3 (26 Apr 2019 14:06), Max: 98.2 (25 Apr 2019 20:28)  HR: 92 (26 Apr 2019 14:06) (86 - 123)  BP: 102/65 (26 Apr 2019 14:06) (95/65 - 115/77)  BP(mean): --  RR: 18 (26 Apr 2019 14:06) (18 - 18)  SpO2: 95% (26 Apr 2019 06:23) (95% - 95%)    LABS:                        12.3   9.17  )-----------( 274      ( 26 Apr 2019 06:52 )             36.8     04-26    132<L>  |  96<L>  |  27<H>  ----------------------------<  95  4.9   |  21  |  1.4    Ca    6.9<L>      26 Apr 2019 06:52  Mg     2.2     04-26    TPro  5.8<L>  /  Alb  3.6  /  TBili  0.9  /  DBili  x   /  AST  55<H>  /  ALT  72<H>  /  AlkPhos  228<H>  04-26    LIVER FUNCTIONS - ( 26 Apr 2019 06:52 )  Alb: 3.6 g/dL / Pro: 5.8 g/dL / ALK PHOS: 228 U/L / ALT: 72 U/L / AST: 55 U/L / GGT: x           PT/INR - ( 24 Apr 2019 23:00 )   PT: 16.30 sec;   INR: 1.42 ratio         PTT - ( 25 Apr 2019 15:35 )  PTT:61.1 sec        RADIOLOGY:  < from: CT Head No Cont (04.24.19 @ 10:10) >  IMPRESSION:    1.No CT evidence of acute intracranial hemorrhage or large territory   infarct.    2.  Severe chronic microvascular changes.    3.  Right sphenoid sinus near complete opacification.      < end of copied text >     MR Angio Neck No Cont (04.25.19 @ 14:00) >  IMPRESSION:   1.  Limited study due to patient motion and motion artifact and lack of   administration of IV contrast.  2.  Grossly no significant stenosis involving the bilateral internal   carotid arteries.  3.  Dominant left vertebral artery.     MR Angio Head No Cont (04.25.19 @ 13:45) >  IMPRESSION:   1.  Suboptimal and limited study due to patient motion and motion   artifact.  2.  Persistent fetal origin of the right PCA off the right internal   carotid artery with small/hypoplastic P1 segment of the right PCA.  3.  Otherwise grossly unremarkable MRA of thebrain without contrast.    MR Head No Cont (04.25.19 @ 13:40) >  IMPRESSION:  1.  Extensive periventricular and subcortical white matter chronic small   vessel ischemic changes and multiple old lacunar infarcts.  2.  No acute infarctsor intracranial hemorrhage.    < from: Transthoracic Echocardiogram (04.25.19 @ 11:14) >  Summary:   1. Moderate to severe right atrial enlargement.   2. LV Ejection Fraction by Novoa's Method with a biplane EF of 44 %.   3. Mildly increased LV wall thickness.   4. Mild to moderate mitral valve regurgitation.   5. Thickening of the anterior and posterior mitral valve leaflets.   6. Moderate-severe tricuspid regurgitation.   7. Estimated pulmonary artery systolic pressure is 43.4 mmHg assuming a   right atrial pressure of 15 mmHg, which is consistent with mild pulmonary   hypertension.      PHYSICAL EXAM:    GENERAL: NAD, looks stated age, AAOx2  HEENT:  Atraumatic, Normocephalic. EOMI, PERRLA, conjunctiva clear, No JVD  PULMONARY: Clear to auscultation bilaterally; No wheeze no crackles  CARDIOVASCULAR: irregular rate and rhythm; No murmurs, rubs, or gallops  GASTROINTESTINAL: Soft, Nontender, Nondistended; Bowel sounds present  MUSCULOSKELETAL: warm, well perfused, 2+ pedal edema up to mid-shin  NEUROLOGY: non-focal,  strength 4/5, ambulates with assistance,   SKIN: No rashes or lesions      ASSESSMENT & PLAN  89 y.o. f w/ PMHx of HTN, DLD presents with a chief complaint of palpitations associated with shortness of breath.    #) New Onset Afib with RVR- rate control  - increase cardizem to toprolol 100 XL   - cw eliquis 2.5 mg q BID for A/C   - normal TSH, borderline elevated F4  - cw tele monitoring  -cardio following, no need for cardioversion    #) acute on chronic HFrEF- resolving  -imaging suggestive of CHF  -B/L LE edema, CT chest - congestion with effusion -   - 2D ECHO showed EF 40% and severe TR  - continue with low dose lasix  - O2 Sat at rest is 95%, O2 sat on ambulation is 87/88%  - will recheck O2 sat tomorrow to determine if patient needs home O2    #TIA  CT Head neg for stroke  MRI/MRA Head and Neck completed- no evidence of ischemia/ hemosiderin deposits  LDL 45- will hold statin given age and cholesterol panel WNL  HbA1c- 6.5- do not need to treat at this time given age   neuro following- can follow up outpatient     #Electrolyte imbalance- hypervolemic hyponatremia,  Bicarb improving, no hyperkalemia  monitor electrolytes, carlos with diuresis  BMP in AM     #Acute transaminitis- possibly due to congestion from CHF exacerbation and severe TR  Liver US: nonspecific mild gallbladder wall edema  monitor LFTs     #MARIO- ? due to diuresis vs cardiorenal syndrome  no hydronephrosis on prior Abd US  monitor electrolytes and renal function  check renal studies  avoid nephrotoxic agents  strict I and Os    #Normocytic anemia  - will obtain anemia work up  - FU H/H    #) HTN  - cw metoprolol    #) DVT/ GI ppx  -eliquis, protonix    #) Code Status  -full code    #) Dispo  -from home, just came from  1 week ago, living with daughters here now  PT pending, but ambulating around unit with assistance  Recheck oxygen on ambulation tomorrow to determine if patient needs home oxygen BHUMIKA GREENE 89y Female  MRN#: [id number]     SUBJECTIVE  Patient is a 89y old Female who presents with a chief complaint of palpitations (25 Apr 2019 15:07)  Currently admitted to medicine with the primary diagnosis of Afib with RVR and TIA     Today is hospital day 2d, no acute events overnight. Not complaining of SOB or CP this morning. Needs assistance with ambulation     OBJECTIVE  Home Medications:  apixaban 2.5 mg oral tablet: 1 tab(s) orally every 12 hours (26 Apr 2019 13:38)  Calcium 500+D oral tablet, chewable: 1 tab(s) orally 2 times a day (24 Apr 2019 02:28)  Colace 100 mg oral capsule: 1 cap(s) orally 2 times a day prn for constipation (26 Apr 2019 13:38)  furosemide 20 mg oral tablet: 1 tab(s) orally once a day (26 Apr 2019 13:38)  PriLOSEC 20 mg oral delayed release capsule: 1 cap(s) orally once a day (24 Apr 2019 02:28)    MEDICATIONS:  STANDING MEDICATIONS  apixaban 2.5 milliGRAM(s) Oral every 12 hours  chlorhexidine 2% Cloths 1 Application(s) Topical <User Schedule>  docusate sodium 100 milliGRAM(s) Oral two times a day  furosemide    Tablet 20 milliGRAM(s) Oral daily  pantoprazole    Tablet 40 milliGRAM(s) Oral before breakfast    PRN MEDICATIONS  simethicone 80 milliGRAM(s) Chew three times a day PRN      VITAL SIGNS: Last 24 Hours  T(C): 36.3 (26 Apr 2019 14:06), Max: 36.8 (25 Apr 2019 20:28)  T(F): 97.3 (26 Apr 2019 14:06), Max: 98.2 (25 Apr 2019 20:28)  HR: 92 (26 Apr 2019 14:06) (86 - 123)  BP: 102/65 (26 Apr 2019 14:06) (95/65 - 115/77)  BP(mean): --  RR: 18 (26 Apr 2019 14:06) (18 - 18)  SpO2: 95% (26 Apr 2019 06:23) (95% - 95%)    LABS:                        12.3   9.17  )-----------( 274      ( 26 Apr 2019 06:52 )             36.8     04-26    132<L>  |  96<L>  |  27<H>  ----------------------------<  95  4.9   |  21  |  1.4    Ca    6.9<L>      26 Apr 2019 06:52  Mg     2.2     04-26    TPro  5.8<L>  /  Alb  3.6  /  TBili  0.9  /  DBili  x   /  AST  55<H>  /  ALT  72<H>  /  AlkPhos  228<H>  04-26    LIVER FUNCTIONS - ( 26 Apr 2019 06:52 )  Alb: 3.6 g/dL / Pro: 5.8 g/dL / ALK PHOS: 228 U/L / ALT: 72 U/L / AST: 55 U/L / GGT: x           PT/INR - ( 24 Apr 2019 23:00 )   PT: 16.30 sec;   INR: 1.42 ratio         PTT - ( 25 Apr 2019 15:35 )  PTT:61.1 sec        RADIOLOGY:  < from: CT Head No Cont (04.24.19 @ 10:10) >  IMPRESSION:    1.No CT evidence of acute intracranial hemorrhage or large territory   infarct.    2.  Severe chronic microvascular changes.    3.  Right sphenoid sinus near complete opacification.      < end of copied text >     MR Angio Neck No Cont (04.25.19 @ 14:00) >  IMPRESSION:   1.  Limited study due to patient motion and motion artifact and lack of   administration of IV contrast.  2.  Grossly no significant stenosis involving the bilateral internal   carotid arteries.  3.  Dominant left vertebral artery.     MR Angio Head No Cont (04.25.19 @ 13:45) >  IMPRESSION:   1.  Suboptimal and limited study due to patient motion and motion   artifact.  2.  Persistent fetal origin of the right PCA off the right internal   carotid artery with small/hypoplastic P1 segment of the right PCA.  3.  Otherwise grossly unremarkable MRA of thebrain without contrast.    MR Head No Cont (04.25.19 @ 13:40) >  IMPRESSION:  1.  Extensive periventricular and subcortical white matter chronic small   vessel ischemic changes and multiple old lacunar infarcts.  2.  No acute infarctsor intracranial hemorrhage.    < from: Transthoracic Echocardiogram (04.25.19 @ 11:14) >  Summary:   1. Moderate to severe right atrial enlargement.   2. LV Ejection Fraction by Novoa's Method with a biplane EF of 44 %.   3. Mildly increased LV wall thickness.   4. Mild to moderate mitral valve regurgitation.   5. Thickening of the anterior and posterior mitral valve leaflets.   6. Moderate-severe tricuspid regurgitation.   7. Estimated pulmonary artery systolic pressure is 43.4 mmHg assuming a   right atrial pressure of 15 mmHg, which is consistent with mild pulmonary   hypertension.      PHYSICAL EXAM:    GENERAL: NAD, looks stated age, AAOx2  HEENT:  Atraumatic, Normocephalic. EOMI, PERRLA, conjunctiva clear, No JVD  PULMONARY: Clear to auscultation bilaterally; No wheeze no crackles  CARDIOVASCULAR: irregular rate and rhythm; No murmurs, rubs, or gallops  GASTROINTESTINAL: Soft, Nontender, Nondistended; Bowel sounds present  MUSCULOSKELETAL: warm, well perfused, 2+ pedal edema up to mid-shin  NEUROLOGY: non-focal,  strength 4/5, ambulates with assistance,   SKIN: No rashes or lesions      ASSESSMENT & PLAN  89 y.o. f w/ PMHx of HTN, DLD presents with a chief complaint of palpitations associated with shortness of breath.    #) New Onset Afib with RVR- rate control  - increase cardizem to toprolol 100 XL   - cw eliquis 2.5 mg q BID for A/C   - normal TSH, borderline elevated F4  - cw tele monitoring  -cardio following, no need for cardioversion    #) acute on chronic HFrEF- resolving  -imaging suggestive of CHF  -B/L LE edema, CT chest - congestion with effusion -   - 2D ECHO showed EF 40% and severe TR  - continue with low dose lasix  - O2 Sat at rest is 95%, O2 sat on ambulation is 87/88%  - Despite IV diuretics, the patient desaturates. Room air PO2 at rest is 95%, RA PO2 while ambulating is 87%, Patient will require home oxygen for discharge. She is aware that she may be going home with oxygen. She was tested in a chronic stable state    #TIA  CT Head neg for stroke  MRI/MRA Head and Neck completed- no evidence of ischemia/ hemosiderin deposits  LDL 45- will hold statin given age and cholesterol panel WNL  HbA1c- 6.5- do not need to treat at this time given age   neuro following- can follow up outpatient     #Electrolyte imbalance- hypervolemic hyponatremia,  Bicarb improving, no hyperkalemia  monitor electrolytes, carlos with diuresis  BMP in AM     #Acute transaminitis- possibly due to congestion from CHF exacerbation and severe TR  Liver US: nonspecific mild gallbladder wall edema  monitor LFTs     #MARIO- ? due to diuresis vs cardiorenal syndrome  no hydronephrosis on prior Abd US  monitor electrolytes and renal function  check renal studies  avoid nephrotoxic agents  strict I and Os    #Normocytic anemia  - will obtain anemia work up  - FU H/H    #) HTN  - cw metoprolol    #) DVT/ GI ppx  -eliquis, protonix    #) Code Status  -full code    #) Dispo  -from home, just came from  1 week ago, living with daughters here now  PT pending, but ambulating around unit with assistance  Recheck oxygen on ambulation tomorrow to determine if patient needs home oxygen BHUMIKA GREENE 89y Female  MRN#: [id number]     SUBJECTIVE  Patient is a 89y old Female who presents with a chief complaint of palpitations (25 Apr 2019 15:07)  Currently admitted to medicine with the primary diagnosis of Afib with RVR and TIA     Today is hospital day 2d, no acute events overnight. Not complaining of SOB or CP this morning. Needs assistance with ambulation     OBJECTIVE  Home Medications:  apixaban 2.5 mg oral tablet: 1 tab(s) orally every 12 hours (26 Apr 2019 13:38)  Calcium 500+D oral tablet, chewable: 1 tab(s) orally 2 times a day (24 Apr 2019 02:28)  Colace 100 mg oral capsule: 1 cap(s) orally 2 times a day prn for constipation (26 Apr 2019 13:38)  furosemide 20 mg oral tablet: 1 tab(s) orally once a day (26 Apr 2019 13:38)  PriLOSEC 20 mg oral delayed release capsule: 1 cap(s) orally once a day (24 Apr 2019 02:28)    MEDICATIONS:  STANDING MEDICATIONS  apixaban 2.5 milliGRAM(s) Oral every 12 hours  chlorhexidine 2% Cloths 1 Application(s) Topical <User Schedule>  docusate sodium 100 milliGRAM(s) Oral two times a day  furosemide    Tablet 20 milliGRAM(s) Oral daily  pantoprazole    Tablet 40 milliGRAM(s) Oral before breakfast    PRN MEDICATIONS  simethicone 80 milliGRAM(s) Chew three times a day PRN      VITAL SIGNS: Last 24 Hours  T(C): 36.3 (26 Apr 2019 14:06), Max: 36.8 (25 Apr 2019 20:28)  T(F): 97.3 (26 Apr 2019 14:06), Max: 98.2 (25 Apr 2019 20:28)  HR: 92 (26 Apr 2019 14:06) (86 - 123)  BP: 102/65 (26 Apr 2019 14:06) (95/65 - 115/77)  BP(mean): --  RR: 18 (26 Apr 2019 14:06) (18 - 18)  SpO2: 95% (26 Apr 2019 06:23) (95% - 95%)    LABS:                        12.3   9.17  )-----------( 274      ( 26 Apr 2019 06:52 )             36.8     04-26    132<L>  |  96<L>  |  27<H>  ----------------------------<  95  4.9   |  21  |  1.4    Ca    6.9<L>      26 Apr 2019 06:52  Mg     2.2     04-26    TPro  5.8<L>  /  Alb  3.6  /  TBili  0.9  /  DBili  x   /  AST  55<H>  /  ALT  72<H>  /  AlkPhos  228<H>  04-26    LIVER FUNCTIONS - ( 26 Apr 2019 06:52 )  Alb: 3.6 g/dL / Pro: 5.8 g/dL / ALK PHOS: 228 U/L / ALT: 72 U/L / AST: 55 U/L / GGT: x           PT/INR - ( 24 Apr 2019 23:00 )   PT: 16.30 sec;   INR: 1.42 ratio         PTT - ( 25 Apr 2019 15:35 )  PTT:61.1 sec        RADIOLOGY:  < from: CT Head No Cont (04.24.19 @ 10:10) >  IMPRESSION:    1.No CT evidence of acute intracranial hemorrhage or large territory   infarct.    2.  Severe chronic microvascular changes.    3.  Right sphenoid sinus near complete opacification.      < end of copied text >     MR Angio Neck No Cont (04.25.19 @ 14:00) >  IMPRESSION:   1.  Limited study due to patient motion and motion artifact and lack of   administration of IV contrast.  2.  Grossly no significant stenosis involving the bilateral internal   carotid arteries.  3.  Dominant left vertebral artery.     MR Angio Head No Cont (04.25.19 @ 13:45) >  IMPRESSION:   1.  Suboptimal and limited study due to patient motion and motion   artifact.  2.  Persistent fetal origin of the right PCA off the right internal   carotid artery with small/hypoplastic P1 segment of the right PCA.  3.  Otherwise grossly unremarkable MRA of thebrain without contrast.    MR Head No Cont (04.25.19 @ 13:40) >  IMPRESSION:  1.  Extensive periventricular and subcortical white matter chronic small   vessel ischemic changes and multiple old lacunar infarcts.  2.  No acute infarctsor intracranial hemorrhage.    < from: Transthoracic Echocardiogram (04.25.19 @ 11:14) >  Summary:   1. Moderate to severe right atrial enlargement.   2. LV Ejection Fraction by Novoa's Method with a biplane EF of 44 %.   3. Mildly increased LV wall thickness.   4. Mild to moderate mitral valve regurgitation.   5. Thickening of the anterior and posterior mitral valve leaflets.   6. Moderate-severe tricuspid regurgitation.   7. Estimated pulmonary artery systolic pressure is 43.4 mmHg assuming a   right atrial pressure of 15 mmHg, which is consistent with mild pulmonary   hypertension.      PHYSICAL EXAM:    GENERAL: NAD, looks stated age, AAOx2  HEENT:  Atraumatic, Normocephalic. EOMI, PERRLA, conjunctiva clear, No JVD  PULMONARY: Clear to auscultation bilaterally; No wheeze no crackles  CARDIOVASCULAR: irregular rate and rhythm; No murmurs, rubs, or gallops  GASTROINTESTINAL: Soft, Nontender, Nondistended; Bowel sounds present  MUSCULOSKELETAL: warm, well perfused, 2+ pedal edema up to mid-shin  NEUROLOGY: non-focal,  strength 4/5, ambulates with assistance,   SKIN: No rashes or lesions      ASSESSMENT & PLAN  89 y.o. f w/ PMHx of HTN, DLD presents with a chief complaint of palpitations associated with shortness of breath.    #) New Onset Afib with RVR- rate control  - increase cardizem to toprolol 100 XL   - cw eliquis 2.5 mg q BID for A/C   - normal TSH, borderline elevated F4  - cw tele monitoring  -cardio following, no need for cardioversion    #) acute on chronic HFrEF- resolving  -imaging suggestive of CHF  -B/L LE edema, CT chest - congestion with effusion -   - 2D ECHO showed EF 40% and severe TR  - continue with low dose lasix  - O2 Sat at rest is 95%, O2 sat on ambulation is 87/88%  - Despite IV diuretics, the patient desaturates. Room air PO2 at rest is 95%, RA PO2 while ambulating is 87%, and PO2 on 2L while ambulating is 95%. Patient will require home oxygen for discharge. She is aware that she will be going home with oxygen. She was tested in a chronic stable state    #TIA  CT Head neg for stroke  MRI/MRA Head and Neck completed- no evidence of ischemia/ hemosiderin deposits  LDL 45- will hold statin given age and cholesterol panel WNL  HbA1c- 6.5- do not need to treat at this time given age   neuro following- can follow up outpatient     #Electrolyte imbalance- hypervolemic hyponatremia,  Bicarb improving, no hyperkalemia  monitor electrolytes, carlos with diuresis  BMP in AM     #Acute transaminitis- possibly due to congestion from CHF exacerbation and severe TR  Liver US: nonspecific mild gallbladder wall edema  monitor LFTs     #MARIO- ? due to diuresis vs cardiorenal syndrome  no hydronephrosis on prior Abd US  monitor electrolytes and renal function  check renal studies  avoid nephrotoxic agents  strict I and Os    #Normocytic anemia  - will obtain anemia work up  - FU H/H    #) HTN  - cw metoprolol    #) DVT/ GI ppx  -eliquis, protonix    #) Code Status  -full code    #) Dispo  -from home, just came from  1 week ago, living with daughters here now  PT pending, but ambulating around unit with assistance  Recheck oxygen on ambulation tomorrow to determine if patient needs home oxygen

## 2019-04-26 NOTE — DISCHARGE NOTE PROVIDER - HOSPITAL COURSE
89 y.o. f w/ PMHx of HTN, DLD presents with a chief complaint of palpitations associated with shortness of breat, likely secondary to acute on chronic HFrEF exacerbation. On admission she was noted to be in new onset Afib with RVR and patient became hypotensive with new onset dysarthria, and stroke code was called. She was admitted and treated for the following problems:        1. Acute on chronic HFrEF exacerbation    - Cxray showed congestion, 2D echo EF 40% and severe TR    - careful diuresis given hypotesnion, will continue home with low dose lasix     - discharged on home oxygen given patient desaturated to 87% on ambulation on RA.         2. Afib with RVR- rate controlled with metoprolol, started on anticoagulation (no evidence of stroke found on CT or MRI- see below), TSH was normal with boderline elevated F4. Cardiologoy evaluated in house, no need for cardioversion at this time. Follow up with cardiologist outpatient.         3. ?TIA vs. syncope secondary to hypotension    CT Head neg for stroke    MRI/MRA Head and Neck completed- no evidence of ischemia/ hemosiderin deposits    LDL 45- will hold statin given age and cholesterol panel WNL    HbA1c- 6.5- do not need to treat at this time given age     Follow up with neurology outpatient         4. Acute transaminitis noted - likely secondary to congestion from CHF exacerbation and severe TR    Liver US completed- showed nonspecific mild gallbladder wall edema    Recommended outpatient follow up with PMD to repeat labs within 1 week         On day of admission, patient reporting some abdominal pain, likely secondary to constipation. She was given bowel regimen and carafate.     She will be discharged home with home oxygen and homecare services. 89 y.o. f w/ PMHx of HTN, DLD presents with a chief complaint of palpitations associated with shortness of breat, likely secondary to acute on chronic HFrEF exacerbation. On admission she was noted to be in new onset Afib with RVR and patient became hypotensive with new onset dysarthria, and stroke code was called. She was admitted and treated for the following problems:        1. Acute on chronic HFrEF exacerbation    - Cxray showed congestion, 2D echo EF 40% and severe TR    - careful diuresis given hypotesnion, will continue home with low dose lasix     - discharged on home oxygen given patient desaturated to 87% on ambulation on RA.         2. Afib with RVR- rate controlled with metoprolol, started on anticoagulation (no evidence of stroke found on CT or MRI- see below), TSH was normal with boderline elevated F4. Cardiologoy evaluated in house, no need for cardioversion at this time. Follow up with cardiologist outpatient.         3. ?TIA vs. syncope secondary to hypotension    CT Head neg for stroke    MRI/MRA Head and Neck completed- no evidence of ischemia/ hemosiderin deposits    LDL 45- will hold statin given age and cholesterol panel WNL    HbA1c- 6.5- do not need to treat at this time given age     Follow up with neurology outpatient         4. Acute transaminitis noted - likely secondary to congestion from CHF exacerbation and severe TR    Liver US completed- showed nonspecific mild gallbladder wall edema    Recommended outpatient follow up with PMD to repeat labs within 1 week         On day of admission, patient reporting some abdominal pain, likely secondary to constipation. Abdominal Xray negative for perforation, showed large stool load. She was given bowel regimen and carafate.     She will be discharged home with home oxygen and homecare services.

## 2019-04-27 VITALS — HEART RATE: 52 BPM | TEMPERATURE: 97 F | SYSTOLIC BLOOD PRESSURE: 138 MMHG | DIASTOLIC BLOOD PRESSURE: 92 MMHG

## 2019-04-27 LAB
ALBUMIN SERPL ELPH-MCNC: 3.6 G/DL — SIGNIFICANT CHANGE UP (ref 3.5–5.2)
ALP SERPL-CCNC: 209 U/L — HIGH (ref 30–115)
ALT FLD-CCNC: 57 U/L — HIGH (ref 0–41)
ANION GAP SERPL CALC-SCNC: 15 MMOL/L — HIGH (ref 7–14)
AST SERPL-CCNC: 46 U/L — HIGH (ref 0–41)
BILIRUB SERPL-MCNC: 0.9 MG/DL — SIGNIFICANT CHANGE UP (ref 0.2–1.2)
BUN SERPL-MCNC: 24 MG/DL — HIGH (ref 10–20)
CALCIUM SERPL-MCNC: 6.5 MG/DL — LOW (ref 8.5–10.1)
CHLORIDE SERPL-SCNC: 96 MMOL/L — LOW (ref 98–110)
CO2 SERPL-SCNC: 20 MMOL/L — SIGNIFICANT CHANGE UP (ref 17–32)
CREAT SERPL-MCNC: 1 MG/DL — SIGNIFICANT CHANGE UP (ref 0.7–1.5)
FERRITIN SERPL-MCNC: 153 NG/ML — HIGH (ref 15–150)
GLUCOSE SERPL-MCNC: 81 MG/DL — SIGNIFICANT CHANGE UP (ref 70–99)
HCT VFR BLD CALC: 35.8 % — LOW (ref 37–47)
HGB BLD-MCNC: 11.5 G/DL — LOW (ref 12–16)
MAGNESIUM SERPL-MCNC: 2.1 MG/DL — SIGNIFICANT CHANGE UP (ref 1.8–2.4)
MCHC RBC-ENTMCNC: 28.4 PG — SIGNIFICANT CHANGE UP (ref 27–31)
MCHC RBC-ENTMCNC: 32.1 G/DL — SIGNIFICANT CHANGE UP (ref 32–37)
MCV RBC AUTO: 88.4 FL — SIGNIFICANT CHANGE UP (ref 81–99)
NRBC # BLD: 0 /100 WBCS — SIGNIFICANT CHANGE UP (ref 0–0)
PLATELET # BLD AUTO: 276 K/UL — SIGNIFICANT CHANGE UP (ref 130–400)
POTASSIUM SERPL-MCNC: 4.7 MMOL/L — SIGNIFICANT CHANGE UP (ref 3.5–5)
POTASSIUM SERPL-SCNC: 4.7 MMOL/L — SIGNIFICANT CHANGE UP (ref 3.5–5)
PROT SERPL-MCNC: 5.6 G/DL — LOW (ref 6–8)
RBC # BLD: 4.05 M/UL — LOW (ref 4.2–5.4)
RBC # FLD: 15.4 % — HIGH (ref 11.5–14.5)
SODIUM SERPL-SCNC: 131 MMOL/L — LOW (ref 135–146)
WBC # BLD: 9.68 K/UL — SIGNIFICANT CHANGE UP (ref 4.8–10.8)
WBC # FLD AUTO: 9.68 K/UL — SIGNIFICANT CHANGE UP (ref 4.8–10.8)

## 2019-04-27 PROCEDURE — 99232 SBSQ HOSP IP/OBS MODERATE 35: CPT

## 2019-04-27 PROCEDURE — 74018 RADEX ABDOMEN 1 VIEW: CPT | Mod: 26

## 2019-04-27 RX ORDER — SENNA PLUS 8.6 MG/1
2 TABLET ORAL AT BEDTIME
Qty: 0 | Refills: 0 | Status: DISCONTINUED | OUTPATIENT
Start: 2019-04-27 | End: 2019-04-27

## 2019-04-27 RX ORDER — METOPROLOL TARTRATE 50 MG
1 TABLET ORAL
Qty: 30 | Refills: 0 | OUTPATIENT
Start: 2019-04-27 | End: 2019-05-26

## 2019-04-27 RX ORDER — APIXABAN 2.5 MG/1
1 TABLET, FILM COATED ORAL
Qty: 60 | Refills: 0 | OUTPATIENT
Start: 2019-04-27 | End: 2019-05-26

## 2019-04-27 RX ORDER — METOCLOPRAMIDE HCL 10 MG
5 TABLET ORAL
Qty: 0 | Refills: 0 | Status: DISCONTINUED | OUTPATIENT
Start: 2019-04-27 | End: 2019-04-27

## 2019-04-27 RX ORDER — FUROSEMIDE 40 MG
1 TABLET ORAL
Qty: 30 | Refills: 0 | OUTPATIENT
Start: 2019-04-27 | End: 2019-05-26

## 2019-04-27 RX ORDER — SENNA PLUS 8.6 MG/1
2 TABLET ORAL
Qty: 60 | Refills: 0 | OUTPATIENT
Start: 2019-04-27 | End: 2019-05-26

## 2019-04-27 RX ORDER — SUCRALFATE 1 G
1 TABLET ORAL
Qty: 120 | Refills: 0 | OUTPATIENT
Start: 2019-04-27 | End: 2019-05-26

## 2019-04-27 RX ORDER — METOPROLOL TARTRATE 50 MG
5 TABLET ORAL ONCE
Qty: 0 | Refills: 0 | Status: COMPLETED | OUTPATIENT
Start: 2019-04-27 | End: 2019-04-27

## 2019-04-27 RX ORDER — SUCRALFATE 1 G
1 TABLET ORAL
Qty: 0 | Refills: 0 | Status: DISCONTINUED | OUTPATIENT
Start: 2019-04-27 | End: 2019-04-27

## 2019-04-27 RX ADMIN — APIXABAN 2.5 MILLIGRAM(S): 2.5 TABLET, FILM COATED ORAL at 06:08

## 2019-04-27 RX ADMIN — Medication 20 MILLIGRAM(S): at 06:08

## 2019-04-27 RX ADMIN — Medication 100 MILLIGRAM(S): at 06:08

## 2019-04-27 RX ADMIN — Medication 5 MILLIGRAM(S): at 04:13

## 2019-04-27 RX ADMIN — Medication 100 MILLIGRAM(S): at 17:04

## 2019-04-27 RX ADMIN — Medication 1 GRAM(S): at 11:20

## 2019-04-27 RX ADMIN — Medication 1 GRAM(S): at 17:04

## 2019-04-27 RX ADMIN — APIXABAN 2.5 MILLIGRAM(S): 2.5 TABLET, FILM COATED ORAL at 17:04

## 2019-04-27 RX ADMIN — PANTOPRAZOLE SODIUM 40 MILLIGRAM(S): 20 TABLET, DELAYED RELEASE ORAL at 06:08

## 2019-04-27 NOTE — PROGRESS NOTE ADULT - ASSESSMENT
C/w medical therapy.  HR controlled.  C/w diuresis.  On anticoagulation for AF.  D/c planning as per primary team.

## 2019-04-27 NOTE — CHART NOTE - NSCHARTNOTEFT_GEN_A_CORE
<<<RESIDENT DISCHARGE NOTE>>>     BHUMIKA GREENE  MRN-[id number]    Evaluated at bedside this morning, resting comfortably in bed. No CP, no SOB, reporting some abdominal pain, has not BM in a few days. Got Abd Xray- official read pending, but looks negative for perforation and shows large stool load. Started on senna (was already on colace) and carafate.     Patient can be discharged today, but waiting for home oxygen delivery.     VITAL SIGNS:  T(F): 96 (04-27-19 @ 04:27), Max: 97.3 (04-26-19 @ 14:06)  HR: 73 (04-27-19 @ 06:16)  BP: 118/90 (04-27-19 @ 06:16)  SpO2: 98% (04-27-19 @ 06:16)  Weight (kg): 69.6 (04-27-19 @ 11:11)  BMI (kg/m2): 30 (04-27-19 @ 11:11)    PHYSICAL EXAMINATION:  GENERAL: NAD, looks stated age, AAOx2  HEENT:  Atraumatic, Normocephalic. EOMI, PERRLA, conjunctiva clear, No JVD  PULMONARY: Clear to auscultation bilaterally; No wheeze no crackles  CARDIOVASCULAR: irregular rate and rhythm; No murmurs, rubs, or gallops  GASTROINTESTINAL: Soft, Nontender, Nondistended; Bowel sounds present  MUSCULOSKELETAL: warm, well perfused, 2+ pedal edema up to mid-shin  NEUROLOGY: non-focal,  strength 4/5, ambulates with assistance,   SKIN: No rashes or lesions    TEST RESULTS:                        11.5   9.68  )-----------( 276      ( 27 Apr 2019 06:43 )             35.8       04-27    131<L>  |  96<L>  |  24<H>  ----------------------------<  81  4.7   |  20  |  1.0    Ca    6.5<L>      27 Apr 2019 06:43  Mg     2.1     04-27    TPro  5.6<L>  /  Alb  3.6  /  TBili  0.9  /  DBili  x   /  AST  46<H>  /  ALT  57<H>  /  AlkPhos  209<H>  04-27      89 y.o. f w/ PMHx of HTN, DLD presents with a chief complaint of palpitations associated with shortness of breat, likely secondary to acute on chronic HFrEF exacerbation. On admission she was noted to be in new onset Afib with RVR and patient became hypotensive with new onset dysarthria, and stroke code was called. She was admitted and treated for the following problems:    1. Acute on chronic HFrEF exacerbation  - Cxray showed congestion, 2D echo EF 40% and severe TR  - careful diuresis given hypotesnion, will continue home with low dose lasix   - discharged on home oxygen given patient desaturated to 87% on ambulation on RA.     2. Afib with RVR- rate controlled with metoprolol, started on anticoagulation (no evidence of stroke found on CT or MRI- see below), TSH was normal with boderline elevated F4. Cardiologoy evaluated in house, no need for cardioversion at this time. Follow up with cardiologist outpatient.     3. ?TIA vs. syncope secondary to hypotension  CT Head neg for stroke  MRI/MRA Head and Neck completed- no evidence of ischemia/ hemosiderin deposits  LDL 45- will hold statin given age and cholesterol panel WNL  HbA1c- 6.5- do not need to treat at this time given age   Follow up with neurology outpatient     4. Acute transaminitis noted - likely secondary to congestion from CHF exacerbation and severe TR  Liver US completed- showed nonspecific mild gallbladder wall edema  Recommended outpatient follow up with PMD to repeat labs within 1 week     On day of admission, patient reporting some abdominal pain, likely secondary to constipation. Abdominal Xray negative for perforation, showed large stool load. She was given bowel regimen and carafate.   She will be discharged home with home oxygen and homecare services.     FINAL DISCHARGE INTERVIEW:  Resident(s) Present: (Name: Ana Morfin MD    DISCHARGE MEDICATION RECONCILIATION  reviewed with Attending Dr. Gutierrez    DISPOSITION:   [  ] Home,    [  x] Home with Visiting Nursing Services,

## 2019-04-27 NOTE — PROGRESS NOTE ADULT - SUBJECTIVE AND OBJECTIVE BOX
SUBJ:  Patient seen and examined. HR better controlled. No CP. Supine with no dyspnea (on supplemental O2)      MEDICATIONS  (STANDING):  apixaban 2.5 milliGRAM(s) Oral every 12 hours  chlorhexidine 2% Cloths 1 Application(s) Topical <User Schedule>  docusate sodium 100 milliGRAM(s) Oral two times a day  furosemide    Tablet 20 milliGRAM(s) Oral daily  pantoprazole    Tablet 40 milliGRAM(s) Oral before breakfast  senna 2 Tablet(s) Oral at bedtime  sucralfate 1 Gram(s) Oral four times a day    MEDICATIONS  (PRN):  simethicone 80 milliGRAM(s) Chew three times a day PRN Gas            Vital Signs Last 24 Hrs  T(C): 35.6 (27 Apr 2019 04:27), Max: 36.3 (26 Apr 2019 14:06)  T(F): 96 (27 Apr 2019 04:27), Max: 97.3 (26 Apr 2019 14:06)  HR: 73 (27 Apr 2019 06:16) (63 - 92)  BP: 118/90 (27 Apr 2019 06:16) (84/41 - 123/82)  BP(mean): --  RR: 18 (26 Apr 2019 20:47) (18 - 18)  SpO2: 98% (27 Apr 2019 06:16) (98% - 98%)      PHYSICAL EXAM:    GEN:  NAD  HEENT: NC/AT  Neck: No JVD  CV: irreg, S1-S2  Lungs: CTAB  Abd: Soft, non-tender  Ext: + edema      I&O's Summary    26 Apr 2019 07:01  -  27 Apr 2019 07:00  --------------------------------------------------------  IN: 1120 mL / OUT: 450 mL / NET: 670 mL    	    < from: Transthoracic Echocardiogram (04.25.19 @ 11:14) >  Summary:   1. Moderate to severe right atrial enlargement.   2. LV Ejection Fraction by Novoa's Method with a biplane EF of 44 %.   3. Mildly increased LV wall thickness.   4. Mild to moderate mitral valve regurgitation.   5. Thickening of the anterior and posterior mitral valve leaflets.   6. Moderate-severe tricuspid regurgitation.   7. Estimated pulmonary artery systolic pressure is 43.4 mmHg assuming a   right atrial pressure of 15 mmHg, which is consistent with mild pulmonary   hypertension.    < end of copied text >      LABS:                        11.5   9.68  )-----------( 276      ( 27 Apr 2019 06:43 )             35.8     04-27    131<L>  |  96<L>  |  24<H>  ----------------------------<  81  4.7   |  20  |  1.0    Ca    6.5<L>      27 Apr 2019 06:43  Mg     2.1     04-27    TPro  5.6<L>  /  Alb  3.6  /  TBili  0.9  /  DBili  x   /  AST  46<H>  /  ALT  57<H>  /  AlkPhos  209<H>  04-27        PTT - ( 25 Apr 2019 15:35 )  PTT:61.1 sec      BNP  RADIOLOGY & ADDITIONAL STUDIES:      IMPRESSION AND PLAN:

## 2019-04-29 PROBLEM — I10 ESSENTIAL (PRIMARY) HYPERTENSION: Chronic | Status: ACTIVE | Noted: 2019-04-23

## 2019-04-29 PROBLEM — E78.5 HYPERLIPIDEMIA, UNSPECIFIED: Chronic | Status: ACTIVE | Noted: 2019-04-24

## 2019-05-02 ENCOUNTER — OUTPATIENT (OUTPATIENT)
Dept: OUTPATIENT SERVICES | Facility: HOSPITAL | Age: 84
LOS: 1 days | Discharge: HOME | End: 2019-05-02

## 2019-05-02 ENCOUNTER — APPOINTMENT (OUTPATIENT)
Dept: INTERNAL MEDICINE | Facility: CLINIC | Age: 84
End: 2019-05-02

## 2019-05-02 VITALS
HEART RATE: 81 BPM | DIASTOLIC BLOOD PRESSURE: 73 MMHG | SYSTOLIC BLOOD PRESSURE: 105 MMHG | BODY MASS INDEX: 26.61 KG/M2 | HEIGHT: 59 IN | WEIGHT: 132 LBS

## 2019-05-02 DIAGNOSIS — I11.0 HYPERTENSIVE HEART DISEASE WITH HEART FAILURE: ICD-10-CM

## 2019-05-02 DIAGNOSIS — I07.1 RHEUMATIC TRICUSPID INSUFFICIENCY: ICD-10-CM

## 2019-05-02 DIAGNOSIS — I48.91 UNSPECIFIED ATRIAL FIBRILLATION: ICD-10-CM

## 2019-05-02 DIAGNOSIS — E78.5 HYPERLIPIDEMIA, UNSPECIFIED: ICD-10-CM

## 2019-05-02 DIAGNOSIS — K76.1 CHRONIC PASSIVE CONGESTION OF LIVER: ICD-10-CM

## 2019-05-02 DIAGNOSIS — E87.5 HYPERKALEMIA: ICD-10-CM

## 2019-05-02 DIAGNOSIS — E87.1 HYPO-OSMOLALITY AND HYPONATREMIA: ICD-10-CM

## 2019-05-02 DIAGNOSIS — Z00.00 ENCOUNTER FOR GENERAL ADULT MEDICAL EXAMINATION W/OUT ABNORMAL FINDINGS: ICD-10-CM

## 2019-05-02 DIAGNOSIS — N17.0 ACUTE KIDNEY FAILURE WITH TUBULAR NECROSIS: ICD-10-CM

## 2019-05-02 DIAGNOSIS — I50.23 ACUTE ON CHRONIC SYSTOLIC (CONGESTIVE) HEART FAILURE: ICD-10-CM

## 2019-05-02 DIAGNOSIS — E87.2 ACIDOSIS: ICD-10-CM

## 2019-05-02 DIAGNOSIS — G93.49 OTHER ENCEPHALOPATHY: ICD-10-CM

## 2019-05-02 DIAGNOSIS — I48.92 UNSPECIFIED ATRIAL FLUTTER: ICD-10-CM

## 2019-05-02 DIAGNOSIS — R57.8 OTHER SHOCK: ICD-10-CM

## 2019-05-02 DIAGNOSIS — D64.9 ANEMIA, UNSPECIFIED: ICD-10-CM

## 2019-05-02 PROBLEM — Z87.19 HISTORY OF GASTRITIS: Status: RESOLVED | Noted: 2019-05-02 | Resolved: 2019-05-02

## 2019-05-02 PROBLEM — Z87.39 HISTORY OF OSTEOPOROSIS: Status: RESOLVED | Noted: 2019-05-02 | Resolved: 2019-05-02

## 2019-05-02 PROBLEM — Z78.9 DOES NOT USE ILLICIT DRUGS: Status: ACTIVE | Noted: 2019-05-02

## 2019-05-02 PROBLEM — Z87.19 HISTORY OF CONSTIPATION: Status: RESOLVED | Noted: 2019-05-02 | Resolved: 2019-05-02

## 2019-05-02 NOTE — PLAN
[FreeTextEntry1] : #Atrial fibrillation\par -currently asymptomatic and rate controlled\par -c/w eliquis and metoprolol\par -f/u with cardiology  on 5/7 with \par \par #Chronic decompensated systolic HF with severe TR\par -Careful diuresis. c/w Furosemide 20mg\par -c/w metoprolol, furosemide\par -Avoid statin because of age and low LDL 45\par \par #Hypertension\par -c/w metoprolol\par \par #HCM\par -RTC in 2 months with repeat CBC, CMP, Hb A1c, lipid profile\par

## 2019-05-02 NOTE — HISTORY OF PRESENT ILLNESS
[FreeTextEntry1] : [id number] [FreeTextEntry2] : Jaguar [de-identified] : Patient is a 89 y/o Wolof speaking  F with PMH of Hypertension,dyslipidemia, recently diagnosed atrial fibrillation, CHF presented for follow up after she recently got discharged from [facility] on 4/26.\par Patient was admitted for palpitations, she was found to have atrial fibrillation for which she was started on eliquis and metoprolol. Patient was also in CHF with transaminitis. ECHO showed severe TR and EF 44%. Patient also had transient dysarthria for which she was worked up for stroke. Stroke work up was negative.\par Patient is here for follow up. c/o Cough which is dry, present during the middle of the night.\par No c/o chest pain, dyspnea, palpitations, lower extremity edema noted.\par \par

## 2019-05-04 DIAGNOSIS — Z00.00 ENCOUNTER FOR GENERAL ADULT MEDICAL EXAMINATION WITHOUT ABNORMAL FINDINGS: ICD-10-CM

## 2019-05-04 DIAGNOSIS — I50.9 HEART FAILURE, UNSPECIFIED: ICD-10-CM

## 2019-05-04 DIAGNOSIS — I10 ESSENTIAL (PRIMARY) HYPERTENSION: ICD-10-CM

## 2019-05-04 DIAGNOSIS — I48.2 CHRONIC ATRIAL FIBRILLATION: ICD-10-CM

## 2019-05-06 ENCOUNTER — APPOINTMENT (OUTPATIENT)
Dept: CARDIOLOGY | Facility: CLINIC | Age: 84
End: 2019-05-06
Payer: MEDICARE

## 2019-05-06 VITALS
SYSTOLIC BLOOD PRESSURE: 116 MMHG | BODY MASS INDEX: 26.21 KG/M2 | HEIGHT: 59 IN | DIASTOLIC BLOOD PRESSURE: 70 MMHG | HEART RATE: 75 BPM | WEIGHT: 130 LBS

## 2019-05-06 DIAGNOSIS — Z87.39 PERSONAL HISTORY OF OTHER DISEASES OF THE MUSCULOSKELETAL SYSTEM AND CONNECTIVE TISSUE: ICD-10-CM

## 2019-05-06 PROCEDURE — 99214 OFFICE O/P EST MOD 30 MIN: CPT

## 2019-05-06 PROCEDURE — 93000 ELECTROCARDIOGRAM COMPLETE: CPT

## 2019-05-06 NOTE — PHYSICAL EXAM
[General Appearance - Well Developed] : well developed [Normal Appearance] : normal appearance [Well Groomed] : well groomed [General Appearance - Well Nourished] : well nourished [General Appearance - In No Acute Distress] : no acute distress [Normal Conjunctiva] : the conjunctiva exhibited no abnormalities [Normal Oral Mucosa] : normal oral mucosa [Normal Oropharynx] : normal oropharynx [] : no respiratory distress [Respiration, Rhythm And Depth] : normal respiratory rhythm and effort [Exaggerated Use Of Accessory Muscles For Inspiration] : no accessory muscle use [Heart Sounds] : normal S1 and S2 [Auscultation Breath Sounds / Voice Sounds] : lungs were clear to auscultation bilaterally [Tachycardic ___] : the heart rate was tachycardic at [unfilled] bpm [Irregularly Irregular] : the rhythm was irregularly irregular [Systolic grade ___/6] : A grade [unfilled]/6 systolic murmur was heard. [FreeTextEntry1] : trace edema [Bowel Sounds] : normal bowel sounds [Abdomen Soft] : soft [Nail Clubbing] : no clubbing of the fingernails [Abdomen Tenderness] : non-tender [Skin Color & Pigmentation] : normal skin color and pigmentation [Petechial Hemorrhages (___cm)] : no petechial hemorrhages [Cyanosis, Localized] : no localized cyanosis [Affect] : the affect was normal [Oriented To Time, Place, And Person] : oriented to person, place, and time

## 2019-05-06 NOTE — HISTORY OF PRESENT ILLNESS
[FreeTextEntry1] : 89 y/o female with history as below, recently in the hospital with rapid AF, better with BB/Cardizem, anticoagulated with Eliquis, presents for f/u. Had CVA like symptoms in the hospital - resolved and w/u is negative. Dyspnea is better. No palpitations, but HR is elevated today. EF 40-45%. Using portable O2.

## 2019-05-07 ENCOUNTER — APPOINTMENT (OUTPATIENT)
Dept: CARDIOLOGY | Facility: CLINIC | Age: 84
End: 2019-05-07

## 2019-05-07 DIAGNOSIS — Z78.9 OTHER SPECIFIED HEALTH STATUS: ICD-10-CM

## 2019-05-07 DIAGNOSIS — Z87.39 PERSONAL HISTORY OF OTHER DISEASES OF THE MUSCULOSKELETAL SYSTEM AND CONNECTIVE TISSUE: ICD-10-CM

## 2019-05-07 DIAGNOSIS — Z87.19 PERSONAL HISTORY OF OTHER DISEASES OF THE DIGESTIVE SYSTEM: ICD-10-CM

## 2019-05-24 ENCOUNTER — OUTPATIENT (OUTPATIENT)
Dept: OUTPATIENT SERVICES | Facility: HOSPITAL | Age: 84
LOS: 1 days | Discharge: HOME | End: 2019-05-24

## 2019-05-24 DIAGNOSIS — Z00.00 ENCOUNTER FOR GENERAL ADULT MEDICAL EXAMINATION WITHOUT ABNORMAL FINDINGS: ICD-10-CM

## 2019-05-28 LAB
25(OH)D3 SERPL-MCNC: 34 NG/ML
ALBUMIN SERPL ELPH-MCNC: 4.1 G/DL
ALP BLD-CCNC: 118 U/L
ALT SERPL-CCNC: 24 U/L
ANION GAP SERPL CALC-SCNC: 16 MMOL/L
AST SERPL-CCNC: 39 U/L
BASOPHILS # BLD AUTO: 0.05 K/UL
BASOPHILS NFR BLD AUTO: 0.7 %
BILIRUB SERPL-MCNC: 0.8 MG/DL
BUN SERPL-MCNC: 22 MG/DL
CALCIUM SERPL-MCNC: 7.4 MG/DL
CHLORIDE SERPL-SCNC: 103 MMOL/L
CHOLEST SERPL-MCNC: 142 MG/DL
CHOLEST/HDLC SERPL: 2.3 RATIO
CO2 SERPL-SCNC: 27 MMOL/L
CREAT SERPL-MCNC: 1 MG/DL
EOSINOPHIL # BLD AUTO: 0.09 K/UL
EOSINOPHIL NFR BLD AUTO: 1.2 %
ESTIMATED AVERAGE GLUCOSE: 128 MG/DL
GLUCOSE SERPL-MCNC: 91 MG/DL
HBA1C MFR BLD HPLC: 6.1 %
HCT VFR BLD CALC: 41 %
HDLC SERPL-MCNC: 62 MG/DL
HGB BLD-MCNC: 12.6 G/DL
IMM GRANULOCYTES NFR BLD AUTO: 0.7 %
LDLC SERPL CALC-MCNC: 82 MG/DL
LYMPHOCYTES # BLD AUTO: 2.01 K/UL
LYMPHOCYTES NFR BLD AUTO: 27.5 %
MAN DIFF?: NORMAL
MCHC RBC-ENTMCNC: 28.1 PG
MCHC RBC-ENTMCNC: 30.7 G/DL
MCV RBC AUTO: 91.5 FL
MONOCYTES # BLD AUTO: 0.53 K/UL
MONOCYTES NFR BLD AUTO: 7.3 %
NEUTROPHILS # BLD AUTO: 4.57 K/UL
NEUTROPHILS NFR BLD AUTO: 62.6 %
PLATELET # BLD AUTO: 362 K/UL
POTASSIUM SERPL-SCNC: 5 MMOL/L
PROT SERPL-MCNC: 6.4 G/DL
RBC # BLD: 4.48 M/UL
RBC # FLD: 15.7 %
SODIUM SERPL-SCNC: 146 MMOL/L
TRIGL SERPL-MCNC: 68 MG/DL
WBC # FLD AUTO: 7.3 K/UL

## 2019-06-04 ENCOUNTER — APPOINTMENT (OUTPATIENT)
Dept: PULMONOLOGY | Facility: CLINIC | Age: 84
End: 2019-06-04
Payer: MEDICARE

## 2019-06-04 ENCOUNTER — OUTPATIENT (OUTPATIENT)
Dept: OUTPATIENT SERVICES | Facility: HOSPITAL | Age: 84
LOS: 1 days | Discharge: HOME | End: 2019-06-04

## 2019-06-04 VITALS
OXYGEN SATURATION: 99 % | DIASTOLIC BLOOD PRESSURE: 70 MMHG | BODY MASS INDEX: 26.21 KG/M2 | WEIGHT: 130 LBS | HEART RATE: 72 BPM | SYSTOLIC BLOOD PRESSURE: 110 MMHG | HEIGHT: 59 IN

## 2019-06-04 DIAGNOSIS — Z83.3 FAMILY HISTORY OF DIABETES MELLITUS: ICD-10-CM

## 2019-06-04 PROCEDURE — 99204 OFFICE O/P NEW MOD 45 MIN: CPT

## 2019-06-04 RX ORDER — DOCUSATE SODIUM 100 MG/1
100 CAPSULE, LIQUID FILLED ORAL TWICE DAILY
Refills: 0 | Status: COMPLETED | COMMUNITY
End: 2019-05-02

## 2019-06-04 RX ORDER — SUCRALFATE 1 G/1
1 TABLET ORAL 4 TIMES DAILY
Qty: 120 | Refills: 5 | Status: COMPLETED | COMMUNITY
Start: 2019-05-02 | End: 2019-06-04

## 2019-06-04 RX ORDER — SENNOSIDES 8.6 MG TABLETS 8.6 MG/1
8.6 TABLET ORAL DAILY
Refills: 0 | Status: COMPLETED | COMMUNITY
End: 2019-06-04

## 2019-06-04 NOTE — HISTORY OF PRESENT ILLNESS
[de-identified] : Patient is a 89 y/o Slovenian speaking  F with PMH of HTN, DLD, recently diagnosed atrial fibrillation, HFrEF with mod-sev TR (EF 40%) presents today for evaluation of need for oxygen. Patient was admitted 4/24/2019 for palpitations and SOB, found to be in atrial fibrillation and decompensated heart failure, stay complicated by concern for stroke due to episode of dysarthria which resolved and MRI was negative. CT PE was negative for emboli. She was diuresed in the hospital and and discharged on home o2 2L, weight at that time was 153. Initially she was using the 2L 24 hours/day and weaned off beginning about 2 weeks ago. Now her daughter is just putting it on her at night and when she walks. 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

## 2019-06-04 NOTE — REVIEW OF SYSTEMS
[Fatigue] : fatigue [Negative] : Cardiovascular [Cough] : no cough [Sputum] : not coughing up ~M sputum [Hemoptysis] : no hemoptysis [FreeTextEntry8] : Dyspnea on exertion

## 2019-06-04 NOTE — PHYSICAL EXAM
[General Appearance - In No Acute Distress] : no acute distress [] : no respiratory distress [Exaggerated Use Of Accessory Muscles For Inspiration] : no accessory muscle use [Abdomen Soft] : soft [Abdomen Tenderness] : non-tender [1+ Pitting] : 1+  pitting [Skin Color & Pigmentation] : normal skin color and pigmentation [No Focal Deficits] : no focal deficits [FreeTextEntry1] : Crackles to midlung in R base [FreeTextEntry2] : Pitting edema to ankle

## 2019-06-04 NOTE — ASSESSMENT
[FreeTextEntry1] : Patient is a 89 y/o East Timorese speaking  F with PMH of hypertension,dyslipidemia, recently diagnosed atrial fibrillation, combined HFrEF and HFpEF presents today for evaluation of need for oxygen. Recently DC'ed from hospital for acute decompensated CHF exacerbation on 2L o2, currently using PRN. \par \par Hypoxic respiratory failure likely due to HFrEF 40% with mod-sev TR likely due to MR and systolic and diastolic CHF.  Patient was discharged at a weight of 153, today 130lb with crackles at right base and pitting edema, desaturated to 82-87% on ambulation on room air (99% at rest)\par - Needs home O2 at night and on ambulation\par - Continue with furosemide 20mg QD\par - Continue with metoprolol succinate, ACEI per PMD and cardio\par - 2d echo: PAP 43.4mmHg, mod-sev right atrial enlargement, EF 44%, mildly increased LV wall, mod -sev TR, mild-mod MR - CTA Chest as inpatient showed no evidence of pulmonary emboli or primary lung disease, unclear why patient has severe TR unless MR is underestimated or may be related to worsening MR with uncontrolled A fibrillation \par \par Atrial fibrillation: Rate controlled, no palpitations\par - Continue with cardizem and metoprolol\par - Continue with Eliquis 2.5mg \par -currently asymptomatic and rate controlled\par -c/w eliquis and metoprolol\par - F/u cardiology\par \par Hypertension: Well controlled today (116/70)\par -c/w metoprolol, diltiazem\par \par Hypocalcemia: Ca 7.4 with albumin 4.1, Vit D 34\par - Consider checking PTH per PMD\par

## 2019-06-10 ENCOUNTER — APPOINTMENT (OUTPATIENT)
Dept: CARDIOLOGY | Facility: CLINIC | Age: 84
End: 2019-06-10
Payer: MEDICARE

## 2019-06-10 VITALS
BODY MASS INDEX: 25.8 KG/M2 | DIASTOLIC BLOOD PRESSURE: 76 MMHG | WEIGHT: 128 LBS | SYSTOLIC BLOOD PRESSURE: 116 MMHG | HEIGHT: 59 IN | HEART RATE: 75 BPM

## 2019-06-10 PROCEDURE — 99213 OFFICE O/P EST LOW 20 MIN: CPT

## 2019-06-10 PROCEDURE — 93000 ELECTROCARDIOGRAM COMPLETE: CPT

## 2019-06-10 NOTE — HISTORY OF PRESENT ILLNESS
[FreeTextEntry1] : 89 y/o female with history as below, recently in the hospital with rapid AF, better with BB/Cardizem, anticoagulated with Eliquis, presents for f/u. Had CVA like symptoms in the hospital - resolved and w/u is negative. Dyspnea is better. No palpitations. Converted to NSR. EF 40-45%. Using portable O2.

## 2019-06-10 NOTE — PHYSICAL EXAM
[General Appearance - Well Developed] : well developed [Well Groomed] : well groomed [Normal Appearance] : normal appearance [General Appearance - In No Acute Distress] : no acute distress [General Appearance - Well Nourished] : well nourished [Normal Oral Mucosa] : normal oral mucosa [Normal Conjunctiva] : the conjunctiva exhibited no abnormalities [Normal Oropharynx] : normal oropharynx [Exaggerated Use Of Accessory Muscles For Inspiration] : no accessory muscle use [] : no respiratory distress [Respiration, Rhythm And Depth] : normal respiratory rhythm and effort [Heart Rate And Rhythm] : heart rate and rhythm were normal [Auscultation Breath Sounds / Voice Sounds] : lungs were clear to auscultation bilaterally [Heart Sounds] : normal S1 and S2 [Tachycardic ___] : the heart rate was tachycardic at [unfilled] bpm [Irregularly Irregular] : the rhythm was irregularly irregular [Systolic grade ___/6] : A grade [unfilled]/6 systolic murmur was heard. [Abdomen Soft] : soft [Bowel Sounds] : normal bowel sounds [FreeTextEntry1] : wheelchair [Abdomen Tenderness] : non-tender [Nail Clubbing] : no clubbing of the fingernails [Cyanosis, Localized] : no localized cyanosis [Petechial Hemorrhages (___cm)] : no petechial hemorrhages [Skin Color & Pigmentation] : normal skin color and pigmentation [Oriented To Time, Place, And Person] : oriented to person, place, and time [Affect] : the affect was normal

## 2019-06-10 NOTE — ASSESSMENT
[FreeTextEntry1] : Atrial fibrillation. Converted to NSR\par Cardizem  mg\par C/w Metoprolol\par C/w Eliquis\par \par Hospital records reviewed.\par Discussed with family.\par F/u in 4 months

## 2019-07-08 ENCOUNTER — RX RENEWAL (OUTPATIENT)
Age: 84
End: 2019-07-08

## 2019-07-11 ENCOUNTER — OUTPATIENT (OUTPATIENT)
Dept: OUTPATIENT SERVICES | Facility: HOSPITAL | Age: 84
LOS: 1 days | Discharge: HOME | End: 2019-07-11

## 2019-07-11 ENCOUNTER — RX RENEWAL (OUTPATIENT)
Age: 84
End: 2019-07-11

## 2019-07-11 ENCOUNTER — APPOINTMENT (OUTPATIENT)
Dept: INTERNAL MEDICINE | Facility: CLINIC | Age: 84
End: 2019-07-11

## 2019-07-11 VITALS
HEART RATE: 50 BPM | DIASTOLIC BLOOD PRESSURE: 72 MMHG | BODY MASS INDEX: 23.79 KG/M2 | SYSTOLIC BLOOD PRESSURE: 132 MMHG | WEIGHT: 118 LBS | HEIGHT: 59 IN

## 2019-07-11 DIAGNOSIS — I48.2 CHRONIC ATRIAL FIBRILLATION: ICD-10-CM

## 2019-07-11 DIAGNOSIS — E83.51 HYPOCALCEMIA: ICD-10-CM

## 2019-07-11 DIAGNOSIS — H61.22 IMPACTED CERUMEN, LEFT EAR: ICD-10-CM

## 2019-07-11 DIAGNOSIS — I50.9 HEART FAILURE, UNSPECIFIED: ICD-10-CM

## 2019-07-11 DIAGNOSIS — I10 ESSENTIAL (PRIMARY) HYPERTENSION: ICD-10-CM

## 2019-07-11 RX ORDER — APIXABAN 2.5 MG/1
2.5 TABLET, FILM COATED ORAL
Qty: 60 | Refills: 5 | Status: ACTIVE | COMMUNITY
Start: 2019-05-02

## 2019-07-11 RX ORDER — OMEPRAZOLE 20 MG/1
20 CAPSULE, DELAYED RELEASE ORAL
Refills: 0 | Status: COMPLETED | COMMUNITY
End: 2019-07-11

## 2019-07-11 RX ORDER — DOCUSATE SODIUM 100 MG/1
100 CAPSULE ORAL TWICE DAILY
Qty: 60 | Refills: 5 | Status: ACTIVE | COMMUNITY
Start: 2019-05-02 | End: 1900-01-01

## 2019-07-11 RX ORDER — OMEPRAZOLE MAGNESIUM 20 MG/1
20 TABLET, DELAYED RELEASE ORAL DAILY
Qty: 30 | Refills: 5 | Status: ACTIVE | COMMUNITY
Start: 2019-05-02

## 2019-07-11 RX ORDER — METOPROLOL SUCCINATE 100 MG/1
100 TABLET, EXTENDED RELEASE ORAL DAILY
Qty: 30 | Refills: 5 | Status: ACTIVE | COMMUNITY
Start: 2019-05-02

## 2019-07-11 RX ORDER — FUROSEMIDE 20 MG/1
20 TABLET ORAL DAILY
Qty: 30 | Refills: 5 | Status: ACTIVE | COMMUNITY
Start: 2019-05-02

## 2019-07-11 RX ORDER — DILTIAZEM HYDROCHLORIDE 120 MG/1
120 CAPSULE, EXTENDED RELEASE ORAL
Qty: 90 | Refills: 3 | Status: ACTIVE | COMMUNITY
Start: 2019-05-06 | End: 1900-01-01

## 2019-07-11 NOTE — HISTORY OF PRESENT ILLNESS
[Family Member] : family member [de-identified] : Patient is a 89 y/o Korean speaking  F (translated by grand daughter) with PMH of Hypertension,dyslipidemia, atrial fibrillation, CHFrEF presented for follow up.\par c/o Mild LE edema around the ankle.\par Was discharged home on oxygen but patient stopped using it and reports no shortness of breath\par c/o abdominal pain which is very mild, intermittent mostly associated with food intake. \par No c/o chest pain, palpitations, shortness of breath, dyspnea\par Patient was recently diagnosed with atrial fibrillation in 4/19 and HF with reduced EF. EF 45-50% and severe TR. Patient is following with pulmonology and cardiology. [FreeTextEntry1] : Follow up visit

## 2019-07-11 NOTE — PLAN
[FreeTextEntry1] : #Atrial fibrillation\par -currently asymptomatic and rate controlled\par -c/w eliquis and metoprolol, cardizem\par \par #Chronic decompensated systolic HF with severe TR\par -Careful diuresis. c/w Furosemide 20mg\par -c/w metoprolol, furosemide\par -Avoid statin because of age and low LDL \par -Educated patient's family to use oxygen on ambulation and at night \par \par #Hypertension\par -c/w metoprolol and cardizem\par \par #Hypocalcemia\par -f/u PTH level\par \par #cerumen impaction in left ear\par -ENT referral provided\par #HCM\par -RTC in 2 months \par

## 2019-07-11 NOTE — PHYSICAL EXAM
[No Acute Distress] : no acute distress [Well Nourished] : well nourished [Well Developed] : well developed [Well-Appearing] : well-appearing [Normal Sclera/Conjunctiva] : normal sclera/conjunctiva [Normal Outer Ear/Nose] : the outer ears and nose were normal in appearance [EOMI] : extraocular movements intact [PERRL] : pupils equal round and reactive to light [Normal Oropharynx] : the oropharynx was normal [No JVD] : no jugular venous distention [No Lymphadenopathy] : no lymphadenopathy [Supple] : supple [Thyroid Normal, No Nodules] : the thyroid was normal and there were no nodules present [No Accessory Muscle Use] : no accessory muscle use [No Respiratory Distress] : no respiratory distress  [Normal Rate] : normal rate  [Normal S1, S2] : normal S1 and S2 [Regular Rhythm] : with a regular rhythm [No Murmur] : no murmur heard [No Abdominal Bruit] : a ~M bruit was not heard ~T in the abdomen [No Carotid Bruits] : no carotid bruits [Pedal Pulses Present] : the pedal pulses are present [No Edema] : there was no peripheral edema [No Varicosities] : no varicosities [Soft] : abdomen soft [No Extremity Clubbing/Cyanosis] : no extremity clubbing/cyanosis [No Palpable Aorta] : no palpable aorta [Non Tender] : non-tender [Non-distended] : non-distended [No Masses] : no abdominal mass palpated [No HSM] : no HSM [Normal Posterior Cervical Nodes] : no posterior cervical lymphadenopathy [Normal Bowel Sounds] : normal bowel sounds [No Spinal Tenderness] : no spinal tenderness [No CVA Tenderness] : no CVA  tenderness [Normal Anterior Cervical Nodes] : no anterior cervical lymphadenopathy [Grossly Normal Strength/Tone] : grossly normal strength/tone [No Joint Swelling] : no joint swelling [No Rash] : no rash [No Focal Deficits] : no focal deficits [Normal Gait] : normal gait [Coordination Grossly Intact] : coordination grossly intact [Deep Tendon Reflexes (DTR)] : deep tendon reflexes were 2+ and symmetric [Normal Affect] : the affect was normal [Normal Insight/Judgement] : insight and judgment were intact

## 2019-07-12 DIAGNOSIS — I48.2 CHRONIC ATRIAL FIBRILLATION: ICD-10-CM

## 2019-07-12 DIAGNOSIS — I10 ESSENTIAL (PRIMARY) HYPERTENSION: ICD-10-CM

## 2019-07-12 DIAGNOSIS — E83.51 HYPOCALCEMIA: ICD-10-CM

## 2019-07-12 DIAGNOSIS — H61.22 IMPACTED CERUMEN, LEFT EAR: ICD-10-CM

## 2019-07-12 DIAGNOSIS — I50.9 HEART FAILURE, UNSPECIFIED: ICD-10-CM

## 2019-09-16 ENCOUNTER — APPOINTMENT (OUTPATIENT)
Dept: CARDIOLOGY | Facility: CLINIC | Age: 84
End: 2019-09-16

## 2021-10-14 NOTE — CONSULT NOTE ADULT - CONSULT REQUESTED DATE/TIME
Airway  Urgency: elective    Date/Time: 10/14/2021 1:13 PM  Airway not difficult    General Information and Staff    Patient location during procedure: OR  CRNA: Tonja Gibson CRNA    Indications and Patient Condition  Indications for airway management: airway protection    Preoxygenated: yes  MILS not maintained throughout  Mask difficulty assessment: 1 - vent by mask    Final Airway Details  Final airway type: endotracheal airway      Successful airway: ETT  Cuffed: yes   Successful intubation technique: direct laryngoscopy  Endotracheal tube insertion site: oral  Blade: Sheila  Blade size: 3  ETT size (mm): 7.0  Cormack-Lehane Classification: grade I - full view of glottis  Placement verified by: chest auscultation and capnometry   Measured from: lips  ETT/EBT  to lips (cm): 20  Number of attempts at approach: 1  Assessment: lips, teeth, and gum same as pre-op and atraumatic intubation    Additional Comments  Negative epigastric sounds, Breath sound equal bilaterally with symmetric chest rise and fall             24-Apr-2019 16:51

## 2023-01-20 NOTE — ASSESSMENT
[FreeTextEntry1] : Atrial fibrillation.\par Add Cardizem  mg\par C/w Metoprolol\par C/w Eliquis\par \par Hospital records reviewed.\par Discussed with family.\par F/u in 3-4 weeks to reassess.  used

## 2025-07-29 NOTE — PATIENT PROFILE ADULT - NSPROMUTINFOINDIVIDFT_GEN_A_NUR
Food For Life  Diet Recommendation 1: Heart Healthy  Diet Recommendation 2: MyPlate  Diet Recommendation 3: Healthy Eating  Food Intolerance Avoidance: NKFA  Nutrition Goals Stated: Follow low sodium diet. Pt  helps watch sodium content of foods.  Household Size: 2 Family Members  Interventions: Referral Number: 1st 6 Mo Referral 6 Mos  Interventions: Visit Number: 5 of 6 Visits - Max 6 Visits/Referral Each 6 Mo Period  Other Interventions: Pt and  grow small garden for fresh veggies.  Education Today: MyPlate Meals  Grains: 0-25% Whole  Fruit: 50-75% Fresh  Vegetables: % Fresh  Proteins: 0 Plant-based Items  Dairy: 0-25% Lowfat  Originating Site of Referral Order: [facility]  Initials of RD Assisting Today: NB    Welsh speaking